# Patient Record
Sex: MALE | Race: WHITE | NOT HISPANIC OR LATINO | Employment: OTHER | URBAN - METROPOLITAN AREA
[De-identification: names, ages, dates, MRNs, and addresses within clinical notes are randomized per-mention and may not be internally consistent; named-entity substitution may affect disease eponyms.]

---

## 2022-01-01 ENCOUNTER — APPOINTMENT (OUTPATIENT)
Dept: WOUND CARE | Facility: HOSPITAL | Age: 87
End: 2022-01-01

## 2022-01-01 ENCOUNTER — LAB REQUISITION (OUTPATIENT)
Dept: LAB | Facility: HOSPITAL | Age: 87
End: 2022-01-01

## 2022-01-01 ENCOUNTER — OFFICE VISIT (OUTPATIENT)
Dept: WOUND CARE | Facility: HOSPITAL | Age: 87
End: 2022-01-01

## 2022-01-01 ENCOUNTER — APPOINTMENT (OUTPATIENT)
Dept: OTHER | Facility: HOSPITAL | Age: 87
End: 2022-01-01

## 2022-01-01 ENCOUNTER — HOSPITAL ENCOUNTER (INPATIENT)
Facility: HOSPITAL | Age: 87
LOS: 1 days | Discharge: HOME | End: 2022-10-29
Attending: EMERGENCY MEDICINE | Admitting: INTERNAL MEDICINE

## 2022-01-01 ENCOUNTER — HOSPITAL ENCOUNTER (INPATIENT)
Facility: HOSPITAL | Age: 87
LOS: 2 days | Discharge: HOME | End: 2022-04-03
Attending: SURGERY | Admitting: SURGERY

## 2022-01-01 ENCOUNTER — ANESTHESIA EVENT (OUTPATIENT)
Dept: PERIOP | Facility: HOSPITAL | Age: 87
End: 2022-01-01

## 2022-01-01 ENCOUNTER — OFFICE VISIT (OUTPATIENT)
Dept: NEUROSURGERY | Facility: CLINIC | Age: 87
End: 2022-01-01

## 2022-01-01 ENCOUNTER — TELEPHONE (OUTPATIENT)
Dept: NEUROSURGERY | Facility: CLINIC | Age: 87
End: 2022-01-01

## 2022-01-01 ENCOUNTER — PRE-ADMISSION TESTING (OUTPATIENT)
Dept: PREADMISSION TESTING | Facility: HOSPITAL | Age: 87
End: 2022-01-01

## 2022-01-01 ENCOUNTER — APPOINTMENT (OUTPATIENT)
Dept: GENERAL RADIOLOGY | Facility: HOSPITAL | Age: 87
End: 2022-01-01

## 2022-01-01 ENCOUNTER — ANESTHESIA (OUTPATIENT)
Dept: PERIOP | Facility: HOSPITAL | Age: 87
End: 2022-01-01

## 2022-01-01 ENCOUNTER — APPOINTMENT (OUTPATIENT)
Dept: PREADMISSION TESTING | Facility: HOSPITAL | Age: 87
End: 2022-01-01

## 2022-01-01 ENCOUNTER — APPOINTMENT (OUTPATIENT)
Dept: CT IMAGING | Facility: HOSPITAL | Age: 87
End: 2022-01-01

## 2022-01-01 ENCOUNTER — READMISSION MANAGEMENT (OUTPATIENT)
Dept: CALL CENTER | Facility: HOSPITAL | Age: 87
End: 2022-01-01

## 2022-01-01 ENCOUNTER — HOSPITAL ENCOUNTER (OUTPATIENT)
Dept: CT IMAGING | Facility: HOSPITAL | Age: 87
Discharge: HOME | End: 2022-11-18
Admitting: NEUROLOGICAL SURGERY

## 2022-01-01 VITALS
WEIGHT: 151.1 LBS | HEART RATE: 78 BPM | OXYGEN SATURATION: 97 % | RESPIRATION RATE: 18 BRPM | BODY MASS INDEX: 22.38 KG/M2 | TEMPERATURE: 97 F | HEIGHT: 69 IN | SYSTOLIC BLOOD PRESSURE: 129 MMHG | DIASTOLIC BLOOD PRESSURE: 79 MMHG

## 2022-01-01 VITALS
SYSTOLIC BLOOD PRESSURE: 118 MMHG | BODY MASS INDEX: 22.22 KG/M2 | WEIGHT: 150 LBS | DIASTOLIC BLOOD PRESSURE: 78 MMHG | HEIGHT: 69 IN | HEART RATE: 67 BPM | RESPIRATION RATE: 18 BRPM

## 2022-01-01 VITALS
HEIGHT: 69 IN | SYSTOLIC BLOOD PRESSURE: 125 MMHG | DIASTOLIC BLOOD PRESSURE: 81 MMHG | BODY MASS INDEX: 21.67 KG/M2 | WEIGHT: 146.3 LBS | RESPIRATION RATE: 16 BRPM

## 2022-01-01 VITALS
HEIGHT: 69 IN | TEMPERATURE: 97.8 F | HEART RATE: 93 BPM | DIASTOLIC BLOOD PRESSURE: 86 MMHG | OXYGEN SATURATION: 100 % | SYSTOLIC BLOOD PRESSURE: 128 MMHG | RESPIRATION RATE: 18 BRPM | WEIGHT: 150 LBS | BODY MASS INDEX: 22.22 KG/M2

## 2022-01-01 DIAGNOSIS — S51.812A SKIN TEAR OF LEFT FOREARM WITHOUT COMPLICATION, INITIAL ENCOUNTER: ICD-10-CM

## 2022-01-01 DIAGNOSIS — Z85.828 PERSONAL HISTORY OF OTHER MALIGNANT NEOPLASM OF SKIN: Primary | ICD-10-CM

## 2022-01-01 DIAGNOSIS — G96.08 TRAUMATIC SUBDURAL HYGROMA: ICD-10-CM

## 2022-01-01 DIAGNOSIS — Z00.00 ENCOUNTER FOR GENERAL ADULT MEDICAL EXAMINATION WITHOUT ABNORMAL FINDINGS: ICD-10-CM

## 2022-01-01 DIAGNOSIS — Z09 FOLLOW-UP EXAM: ICD-10-CM

## 2022-01-01 DIAGNOSIS — G96.08 TRAUMATIC SUBDURAL HYGROMA: Primary | ICD-10-CM

## 2022-01-01 DIAGNOSIS — S01.00XA OPEN WOUND OF SCALP, UNSPECIFIED OPEN WOUND TYPE, INITIAL ENCOUNTER: ICD-10-CM
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
## 2022-01-01 DIAGNOSIS — Z79.01 ON CONTINUOUS ORAL ANTICOAGULATION: ICD-10-CM

## 2022-01-01 DIAGNOSIS — G96.08 SUBDURAL HYGROMA: Primary | ICD-10-CM

## 2022-01-01 DIAGNOSIS — S01.00XD OPEN WOUND OF SCALP, UNSPECIFIED OPEN WOUND TYPE, SUBSEQUENT ENCOUNTER: ICD-10-CM

## 2022-01-01 LAB
ALBUMIN SERPL-MCNC: 4 G/DL (ref 3.5–5.2)
ALBUMIN/GLOB SERPL: 1.6 G/DL
ALP SERPL-CCNC: 157 U/L (ref 39–117)
ALT SERPL W P-5'-P-CCNC: 23 U/L (ref 1–41)
ANION GAP SERPL CALCULATED.3IONS-SCNC: 10.7 MMOL/L (ref 5–15)
ANION GAP SERPL CALCULATED.3IONS-SCNC: 8 MMOL/L (ref 5–15)
ANION GAP SERPL CALCULATED.3IONS-SCNC: 8 MMOL/L (ref 5–15)
ANION GAP SERPL CALCULATED.3IONS-SCNC: 9 MMOL/L (ref 5–15)
AST SERPL-CCNC: 24 U/L (ref 1–40)
BACTERIA SPEC AEROBE CULT: ABNORMAL
BACTERIA SPEC ANAEROBE CULT: NORMAL
BASOPHILS # BLD AUTO: 0.03 10*3/MM3 (ref 0–0.2)
BASOPHILS # BLD AUTO: 0.03 10*3/MM3 (ref 0–0.2)
BASOPHILS NFR BLD AUTO: 0.4 % (ref 0–1.5)
BASOPHILS NFR BLD AUTO: 0.4 % (ref 0–1.5)
BILIRUB SERPL-MCNC: 0.7 MG/DL (ref 0–1.2)
BUN SERPL-MCNC: 22 MG/DL (ref 8–23)
BUN SERPL-MCNC: 25 MG/DL (ref 8–23)
BUN SERPL-MCNC: 26 MG/DL (ref 8–23)
BUN SERPL-MCNC: 28 MG/DL (ref 8–23)
BUN/CREAT SERPL: 20.4 (ref 7–25)
BUN/CREAT SERPL: 22.6 (ref 7–25)
BUN/CREAT SERPL: 22.7 (ref 7–25)
BUN/CREAT SERPL: 25.5 (ref 7–25)
CALCIUM SPEC-SCNC: 8.7 MG/DL (ref 8.2–9.6)
CALCIUM SPEC-SCNC: 8.8 MG/DL (ref 8.2–9.6)
CALCIUM SPEC-SCNC: 9 MG/DL (ref 8.2–9.6)
CALCIUM SPEC-SCNC: 9 MG/DL (ref 8.2–9.6)
CHLORIDE SERPL-SCNC: 100 MMOL/L (ref 98–107)
CHLORIDE SERPL-SCNC: 100 MMOL/L (ref 98–107)
CHLORIDE SERPL-SCNC: 101 MMOL/L (ref 98–107)
CHLORIDE SERPL-SCNC: 103 MMOL/L (ref 98–107)
CO2 SERPL-SCNC: 26 MMOL/L (ref 22–29)
CO2 SERPL-SCNC: 26 MMOL/L (ref 22–29)
CO2 SERPL-SCNC: 28 MMOL/L (ref 22–29)
CO2 SERPL-SCNC: 28.3 MMOL/L (ref 22–29)
CREAT SERPL-MCNC: 1.08 MG/DL (ref 0.76–1.27)
CREAT SERPL-MCNC: 1.1 MG/DL (ref 0.76–1.27)
CREAT SERPL-MCNC: 1.1 MG/DL (ref 0.76–1.27)
CREAT SERPL-MCNC: 1.15 MG/DL (ref 0.76–1.27)
DEPRECATED RDW RBC AUTO: 42.9 FL (ref 37–54)
DEPRECATED RDW RBC AUTO: 44.6 FL (ref 37–54)
DEPRECATED RDW RBC AUTO: 47.6 FL (ref 37–54)
DEPRECATED RDW RBC AUTO: 48.4 FL (ref 37–54)
DEPRECATED RDW RBC AUTO: 48.6 FL (ref 37–54)
EGFRCR SERPLBLD CKD-EPI 2021: 58.2 ML/MIN/1.73
EGFRCR SERPLBLD CKD-EPI 2021: 61.4 ML/MIN/1.73
EGFRCR SERPLBLD CKD-EPI 2021: 61.4 ML/MIN/1.73
EGFRCR SERPLBLD CKD-EPI 2021: 62.8 ML/MIN/1.73
EOSINOPHIL # BLD AUTO: 0.16 10*3/MM3 (ref 0–0.4)
EOSINOPHIL # BLD AUTO: 0.24 10*3/MM3 (ref 0–0.4)
EOSINOPHIL NFR BLD AUTO: 2 % (ref 0.3–6.2)
EOSINOPHIL NFR BLD AUTO: 3 % (ref 0.3–6.2)
ERYTHROCYTE [DISTWIDTH] IN BLOOD BY AUTOMATED COUNT: 13.2 % (ref 12.3–15.4)
ERYTHROCYTE [DISTWIDTH] IN BLOOD BY AUTOMATED COUNT: 13.3 % (ref 12.3–15.4)
ERYTHROCYTE [DISTWIDTH] IN BLOOD BY AUTOMATED COUNT: 13.6 % (ref 12.3–15.4)
ERYTHROCYTE [DISTWIDTH] IN BLOOD BY AUTOMATED COUNT: 13.6 % (ref 12.3–15.4)
ERYTHROCYTE [DISTWIDTH] IN BLOOD BY AUTOMATED COUNT: 13.8 % (ref 12.3–15.4)
GLOBULIN UR ELPH-MCNC: 2.5 GM/DL
GLUCOSE SERPL-MCNC: 119 MG/DL (ref 65–99)
GLUCOSE SERPL-MCNC: 145 MG/DL (ref 65–99)
GLUCOSE SERPL-MCNC: 97 MG/DL (ref 65–99)
GLUCOSE SERPL-MCNC: 97 MG/DL (ref 65–99)
GRAM STN SPEC: ABNORMAL
GRAM STN SPEC: ABNORMAL
HCT VFR BLD AUTO: 29.1 % (ref 37.5–51)
HCT VFR BLD AUTO: 31 % (ref 37.5–51)
HCT VFR BLD AUTO: 32.2 % (ref 37.5–51)
HCT VFR BLD AUTO: 32.7 % (ref 37.5–51)
HCT VFR BLD AUTO: 36.7 % (ref 37.5–51)
HGB BLD-MCNC: 10.3 G/DL (ref 13–17.7)
HGB BLD-MCNC: 10.3 G/DL (ref 13–17.7)
HGB BLD-MCNC: 10.4 G/DL (ref 13–17.7)
HGB BLD-MCNC: 11.4 G/DL (ref 13–17.7)
HGB BLD-MCNC: 9.9 G/DL (ref 13–17.7)
IMM GRANULOCYTES # BLD AUTO: 0.04 10*3/MM3 (ref 0–0.05)
IMM GRANULOCYTES # BLD AUTO: 0.05 10*3/MM3 (ref 0–0.05)
IMM GRANULOCYTES NFR BLD AUTO: 0.5 % (ref 0–0.5)
IMM GRANULOCYTES NFR BLD AUTO: 0.6 % (ref 0–0.5)
LAB AP CASE REPORT: NORMAL
LAB AP CLINICAL INFORMATION: NORMAL
LAB AP DIAGNOSIS COMMENT: NORMAL
LYMPHOCYTES # BLD AUTO: 1.24 10*3/MM3 (ref 0.7–3.1)
LYMPHOCYTES # BLD AUTO: 1.4 10*3/MM3 (ref 0.7–3.1)
LYMPHOCYTES NFR BLD AUTO: 15.7 % (ref 19.6–45.3)
LYMPHOCYTES NFR BLD AUTO: 17.6 % (ref 19.6–45.3)
MCH RBC QN AUTO: 29.6 PG (ref 26.6–33)
MCH RBC QN AUTO: 30.2 PG (ref 26.6–33)
MCH RBC QN AUTO: 30.3 PG (ref 26.6–33)
MCH RBC QN AUTO: 30.5 PG (ref 26.6–33)
MCH RBC QN AUTO: 30.8 PG (ref 26.6–33)
MCHC RBC AUTO-ENTMCNC: 31.1 G/DL (ref 31.5–35.7)
MCHC RBC AUTO-ENTMCNC: 31.5 G/DL (ref 31.5–35.7)
MCHC RBC AUTO-ENTMCNC: 32 G/DL (ref 31.5–35.7)
MCHC RBC AUTO-ENTMCNC: 33.5 G/DL (ref 31.5–35.7)
MCHC RBC AUTO-ENTMCNC: 34 G/DL (ref 31.5–35.7)
MCV RBC AUTO: 89.5 FL (ref 79–97)
MCV RBC AUTO: 91.7 FL (ref 79–97)
MCV RBC AUTO: 94.7 FL (ref 79–97)
MCV RBC AUTO: 95.3 FL (ref 79–97)
MCV RBC AUTO: 95.9 FL (ref 79–97)
MONOCYTES # BLD AUTO: 0.67 10*3/MM3 (ref 0.1–0.9)
MONOCYTES # BLD AUTO: 0.75 10*3/MM3 (ref 0.1–0.9)
MONOCYTES NFR BLD AUTO: 8.4 % (ref 5–12)
MONOCYTES NFR BLD AUTO: 9.5 % (ref 5–12)
NEUTROPHILS NFR BLD AUTO: 5.59 10*3/MM3 (ref 1.7–7)
NEUTROPHILS NFR BLD AUTO: 5.65 10*3/MM3 (ref 1.7–7)
NEUTROPHILS NFR BLD AUTO: 70.9 % (ref 42.7–76)
NEUTROPHILS NFR BLD AUTO: 71 % (ref 42.7–76)
NRBC BLD AUTO-RTO: 0 /100 WBC (ref 0–0.2)
NRBC BLD AUTO-RTO: 0 /100 WBC (ref 0–0.2)
PATH REPORT.FINAL DX SPEC: NORMAL
PATH REPORT.GROSS SPEC: NORMAL
PLATELET # BLD AUTO: 133 10*3/MM3 (ref 140–450)
PLATELET # BLD AUTO: 141 10*3/MM3 (ref 140–450)
PLATELET # BLD AUTO: 141 10*3/MM3 (ref 140–450)
PLATELET # BLD AUTO: 142 10*3/MM3 (ref 140–450)
PLATELET # BLD AUTO: 148 10*3/MM3 (ref 140–450)
PMV BLD AUTO: 9.7 FL (ref 6–12)
PMV BLD AUTO: 9.8 FL (ref 6–12)
PMV BLD AUTO: 9.9 FL (ref 6–12)
POTASSIUM SERPL-SCNC: 3.9 MMOL/L (ref 3.5–5.2)
POTASSIUM SERPL-SCNC: 4 MMOL/L (ref 3.5–5.2)
POTASSIUM SERPL-SCNC: 4.4 MMOL/L (ref 3.5–5.2)
POTASSIUM SERPL-SCNC: 4.7 MMOL/L (ref 3.5–5.2)
PROT SERPL-MCNC: 6.5 G/DL (ref 6–8.5)
QT INTERVAL: 392 MS
RBC # BLD AUTO: 3.25 10*6/MM3 (ref 4.14–5.8)
RBC # BLD AUTO: 3.38 10*6/MM3 (ref 4.14–5.8)
RBC # BLD AUTO: 3.4 10*6/MM3 (ref 4.14–5.8)
RBC # BLD AUTO: 3.41 10*6/MM3 (ref 4.14–5.8)
RBC # BLD AUTO: 3.85 10*6/MM3 (ref 4.14–5.8)
SARS-COV-2 ORF1AB RESP QL NAA+PROBE: NOT DETECTED
SODIUM SERPL-SCNC: 135 MMOL/L (ref 136–145)
SODIUM SERPL-SCNC: 135 MMOL/L (ref 136–145)
SODIUM SERPL-SCNC: 139 MMOL/L (ref 136–145)
SODIUM SERPL-SCNC: 139 MMOL/L (ref 136–145)
WBC NRBC COR # BLD: 10.83 10*3/MM3 (ref 3.4–10.8)
WBC NRBC COR # BLD: 7.89 10*3/MM3 (ref 3.4–10.8)
WBC NRBC COR # BLD: 7.91 10*3/MM3 (ref 3.4–10.8)
WBC NRBC COR # BLD: 7.96 10*3/MM3 (ref 3.4–10.8)
WBC NRBC COR # BLD: 8.08 10*3/MM3 (ref 3.4–10.8)

## 2022-01-01 PROCEDURE — 70450 CT HEAD/BRAIN W/O DYE: CPT

## 2022-01-01 PROCEDURE — 0JB00ZZ EXCISION OF SCALP SUBCUTANEOUS TISSUE AND FASCIA, OPEN APPROACH: ICD-10-PCS | Performed by: SURGERY

## 2022-01-01 PROCEDURE — 0HB7XZZ EXCISION OF ABDOMEN SKIN, EXTERNAL APPROACH: ICD-10-PCS | Performed by: SURGERY

## 2022-01-01 PROCEDURE — 99284 EMERGENCY DEPT VISIT MOD MDM: CPT

## 2022-01-01 PROCEDURE — 25010000002 FENTANYL CITRATE (PF) 50 MCG/ML SOLUTION: Performed by: NURSE ANESTHETIST, CERTIFIED REGISTERED

## 2022-01-01 PROCEDURE — 80048 BASIC METABOLIC PNL TOTAL CA: CPT | Performed by: HOSPITALIST

## 2022-01-01 PROCEDURE — 80048 BASIC METABOLIC PNL TOTAL CA: CPT | Performed by: INTERNAL MEDICINE

## 2022-01-01 PROCEDURE — 63710000001 DIPHENHYDRAMINE PER 50 MG: Performed by: SURGERY

## 2022-01-01 PROCEDURE — 17250 CHEM CAUT OF GRANLTJ TISSUE: CPT

## 2022-01-01 PROCEDURE — G0463 HOSPITAL OUTPT CLINIC VISIT: HCPCS

## 2022-01-01 PROCEDURE — 99232 SBSQ HOSP IP/OBS MODERATE 35: CPT | Performed by: NEUROLOGICAL SURGERY

## 2022-01-01 PROCEDURE — U0005 INFEC AGEN DETEC AMPLI PROBE: HCPCS | Performed by: NURSE PRACTITIONER

## 2022-01-01 PROCEDURE — 85027 COMPLETE CBC AUTOMATED: CPT | Performed by: SURGERY

## 2022-01-01 PROCEDURE — G0378 HOSPITAL OBSERVATION PER HR: HCPCS

## 2022-01-01 PROCEDURE — C9803 HOPD COVID-19 SPEC COLLECT: HCPCS | Performed by: NURSE PRACTITIONER

## 2022-01-01 PROCEDURE — 85027 COMPLETE CBC AUTOMATED: CPT | Performed by: INTERNAL MEDICINE

## 2022-01-01 PROCEDURE — 87077 CULTURE AEROBIC IDENTIFY: CPT | Performed by: SURGERY

## 2022-01-01 PROCEDURE — 25010000002 CEFAZOLIN IN DEXTROSE 2-4 GM/100ML-% SOLUTION: Performed by: SURGERY

## 2022-01-01 PROCEDURE — 87205 SMEAR GRAM STAIN: CPT | Performed by: SURGERY

## 2022-01-01 PROCEDURE — 25010000002 MORPHINE SULFATE (PF) 2 MG/ML SOLUTION: Performed by: SURGERY

## 2022-01-01 PROCEDURE — 87015 SPECIMEN INFECT AGNT CONCNTJ: CPT | Performed by: SURGERY

## 2022-01-01 PROCEDURE — 25010000002 CEFAZOLIN PER 500 MG: Performed by: SURGERY

## 2022-01-01 PROCEDURE — 88305 TISSUE EXAM BY PATHOLOGIST: CPT | Performed by: SURGERY

## 2022-01-01 PROCEDURE — 25010000002 PHENYLEPHRINE 10 MG/ML SOLUTION 5 ML VIAL: Performed by: NURSE ANESTHETIST, CERTIFIED REGISTERED

## 2022-01-01 PROCEDURE — 99221 1ST HOSP IP/OBS SF/LOW 40: CPT | Performed by: NURSE PRACTITIONER

## 2022-01-01 PROCEDURE — 73070 X-RAY EXAM OF ELBOW: CPT

## 2022-01-01 PROCEDURE — 73130 X-RAY EXAM OF HAND: CPT

## 2022-01-01 PROCEDURE — 0JX00ZZ TRANSFER SCALP SUBCUTANEOUS TISSUE AND FASCIA, OPEN APPROACH: ICD-10-PCS | Performed by: SURGERY

## 2022-01-01 PROCEDURE — 87070 CULTURE OTHR SPECIMN AEROBIC: CPT | Performed by: SURGERY

## 2022-01-01 PROCEDURE — 97602 WOUND(S) CARE NON-SELECTIVE: CPT

## 2022-01-01 PROCEDURE — 25010000002 PROPOFOL 10 MG/ML EMULSION: Performed by: NURSE ANESTHETIST, CERTIFIED REGISTERED

## 2022-01-01 PROCEDURE — 36415 COLL VENOUS BLD VENIPUNCTURE: CPT | Performed by: INTERNAL MEDICINE

## 2022-01-01 PROCEDURE — 80053 COMPREHEN METABOLIC PANEL: CPT | Performed by: SURGERY

## 2022-01-01 PROCEDURE — 85025 COMPLETE CBC W/AUTO DIFF WBC: CPT | Performed by: HOSPITALIST

## 2022-01-01 PROCEDURE — U0004 COV-19 TEST NON-CDC HGH THRU: HCPCS | Performed by: NURSE PRACTITIONER

## 2022-01-01 PROCEDURE — 25010000002 PHENYLEPHRINE 10 MG/ML SOLUTION: Performed by: NURSE ANESTHETIST, CERTIFIED REGISTERED

## 2022-01-01 PROCEDURE — C1889 IMPLANT/INSERT DEVICE, NOC: HCPCS | Performed by: SURGERY

## 2022-01-01 PROCEDURE — 87186 SC STD MICRODIL/AGAR DIL: CPT | Performed by: SURGERY

## 2022-01-01 PROCEDURE — 88312 SPECIAL STAINS GROUP 1: CPT | Performed by: SURGERY

## 2022-01-01 PROCEDURE — 25010000002 NEOSTIGMINE 5 MG/10ML SOLUTION: Performed by: NURSE ANESTHETIST, CERTIFIED REGISTERED

## 2022-01-01 PROCEDURE — 99212 OFFICE O/P EST SF 10 MIN: CPT | Performed by: NEUROLOGICAL SURGERY

## 2022-01-01 PROCEDURE — 93005 ELECTROCARDIOGRAM TRACING: CPT | Performed by: SURGERY

## 2022-01-01 PROCEDURE — 87075 CULTR BACTERIA EXCEPT BLOOD: CPT | Performed by: SURGERY

## 2022-01-01 PROCEDURE — 93010 ELECTROCARDIOGRAM REPORT: CPT | Performed by: INTERNAL MEDICINE

## 2022-01-01 DEVICE — LIGACLIP MCA MULTIPLE CLIP APPLIERS, 20 SMALL CLIPS
Type: IMPLANTABLE DEVICE | Site: SCALP | Status: FUNCTIONAL
Brand: LIGACLIP

## 2022-01-01 RX ORDER — NEOSTIGMINE METHYLSULFATE 0.5 MG/ML
INJECTION, SOLUTION INTRAVENOUS AS NEEDED
Status: DISCONTINUED | OUTPATIENT
Start: 2022-01-01 | End: 2022-01-01 | Stop reason: SURG

## 2022-01-01 RX ORDER — FENTANYL CITRATE 50 UG/ML
50 INJECTION, SOLUTION INTRAMUSCULAR; INTRAVENOUS
Status: DISCONTINUED | OUTPATIENT
Start: 2022-01-01 | End: 2022-01-01 | Stop reason: HOSPADM

## 2022-01-01 RX ORDER — OXYCODONE AND ACETAMINOPHEN 7.5; 325 MG/1; MG/1
1 TABLET ORAL EVERY 4 HOURS PRN
Status: DISCONTINUED | OUTPATIENT
Start: 2022-01-01 | End: 2022-01-01 | Stop reason: HOSPADM

## 2022-01-01 RX ORDER — PANTOPRAZOLE SODIUM 40 MG/1
40 TABLET, DELAYED RELEASE ORAL
Status: DISCONTINUED | OUTPATIENT
Start: 2022-01-01 | End: 2022-01-01 | Stop reason: HOSPADM

## 2022-01-01 RX ORDER — PROPOFOL 10 MG/ML
VIAL (ML) INTRAVENOUS AS NEEDED
Status: DISCONTINUED | OUTPATIENT
Start: 2022-01-01 | End: 2022-01-01 | Stop reason: SURG

## 2022-01-01 RX ORDER — LISINOPRIL 10 MG/1
10 TABLET ORAL EVERY EVENING
COMMUNITY
End: 2022-01-01 | Stop reason: ALTCHOICE

## 2022-01-01 RX ORDER — FLUMAZENIL 0.1 MG/ML
0.2 INJECTION INTRAVENOUS AS NEEDED
Status: DISCONTINUED | OUTPATIENT
Start: 2022-01-01 | End: 2022-01-01 | Stop reason: HOSPADM

## 2022-01-01 RX ORDER — UREA 10 %
3 LOTION (ML) TOPICAL NIGHTLY PRN
Status: DISCONTINUED | OUTPATIENT
Start: 2022-01-01 | End: 2022-01-01 | Stop reason: HOSPADM

## 2022-01-01 RX ORDER — MORPHINE SULFATE 2 MG/ML
2 INJECTION, SOLUTION INTRAMUSCULAR; INTRAVENOUS EVERY 4 HOURS PRN
Status: DISCONTINUED | OUTPATIENT
Start: 2022-01-01 | End: 2022-01-01 | Stop reason: HOSPADM

## 2022-01-01 RX ORDER — TORSEMIDE 20 MG/1
20 TABLET ORAL DAILY
Status: DISCONTINUED | OUTPATIENT
Start: 2022-01-01 | End: 2022-01-01 | Stop reason: HOSPADM

## 2022-01-01 RX ORDER — PHENYLEPHRINE HYDROCHLORIDE 10 MG/ML
INJECTION INTRAVENOUS AS NEEDED
Status: DISCONTINUED | OUTPATIENT
Start: 2022-01-01 | End: 2022-01-01 | Stop reason: SURG

## 2022-01-01 RX ORDER — ACETAMINOPHEN 325 MG/1
650 TABLET ORAL EVERY 4 HOURS PRN
Status: DISCONTINUED | OUTPATIENT
Start: 2022-01-01 | End: 2022-01-01 | Stop reason: HOSPADM

## 2022-01-01 RX ORDER — METOPROLOL TARTRATE 100 MG/1
100 TABLET ORAL 2 TIMES DAILY
COMMUNITY

## 2022-01-01 RX ORDER — SODIUM CHLORIDE 0.9 % (FLUSH) 0.9 %
3-10 SYRINGE (ML) INJECTION AS NEEDED
Status: DISCONTINUED | OUTPATIENT
Start: 2022-01-01 | End: 2022-01-01 | Stop reason: HOSPADM

## 2022-01-01 RX ORDER — METOPROLOL TARTRATE 50 MG/1
100 TABLET, FILM COATED ORAL 2 TIMES DAILY
Status: DISCONTINUED | OUTPATIENT
Start: 2022-01-01 | End: 2022-01-01 | Stop reason: HOSPADM

## 2022-01-01 RX ORDER — TORSEMIDE 20 MG/1
20 TABLET ORAL DAILY
COMMUNITY

## 2022-01-01 RX ORDER — FAMOTIDINE 10 MG/ML
20 INJECTION, SOLUTION INTRAVENOUS ONCE
Status: COMPLETED | OUTPATIENT
Start: 2022-01-01 | End: 2022-01-01

## 2022-01-01 RX ORDER — ATORVASTATIN CALCIUM 20 MG/1
20 TABLET, FILM COATED ORAL DAILY
COMMUNITY

## 2022-01-01 RX ORDER — SODIUM CHLORIDE, SODIUM LACTATE, POTASSIUM CHLORIDE, CALCIUM CHLORIDE 600; 310; 30; 20 MG/100ML; MG/100ML; MG/100ML; MG/100ML
9 INJECTION, SOLUTION INTRAVENOUS CONTINUOUS
Status: DISCONTINUED | OUTPATIENT
Start: 2022-01-01 | End: 2022-01-01

## 2022-01-01 RX ORDER — ONDANSETRON 2 MG/ML
4 INJECTION INTRAMUSCULAR; INTRAVENOUS EVERY 6 HOURS PRN
Status: DISCONTINUED | OUTPATIENT
Start: 2022-01-01 | End: 2022-01-01 | Stop reason: HOSPADM

## 2022-01-01 RX ORDER — POTASSIUM CHLORIDE 1.5 G/1.77G
20 POWDER, FOR SOLUTION ORAL DAILY
Status: DISCONTINUED | OUTPATIENT
Start: 2022-01-01 | End: 2022-01-01 | Stop reason: HOSPADM

## 2022-01-01 RX ORDER — HYDROCODONE BITARTRATE AND ACETAMINOPHEN 5; 325 MG/1; MG/1
1 TABLET ORAL EVERY 6 HOURS PRN
Status: DISCONTINUED | OUTPATIENT
Start: 2022-01-01 | End: 2022-01-01 | Stop reason: HOSPADM

## 2022-01-01 RX ORDER — SODIUM CHLORIDE, SODIUM LACTATE, POTASSIUM CHLORIDE, CALCIUM CHLORIDE 600; 310; 30; 20 MG/100ML; MG/100ML; MG/100ML; MG/100ML
75 INJECTION, SOLUTION INTRAVENOUS CONTINUOUS
Status: DISCONTINUED | OUTPATIENT
Start: 2022-01-01 | End: 2022-01-01

## 2022-01-01 RX ORDER — FERROUS SULFATE 325(65) MG
325 TABLET ORAL
Status: DISCONTINUED | OUTPATIENT
Start: 2022-01-01 | End: 2022-01-01 | Stop reason: HOSPADM

## 2022-01-01 RX ORDER — CHOLECALCIFEROL (VITAMIN D3) 125 MCG
1000 CAPSULE ORAL EVERY MORNING
Status: DISCONTINUED | OUTPATIENT
Start: 2022-01-01 | End: 2022-01-01 | Stop reason: HOSPADM

## 2022-01-01 RX ORDER — ATORVASTATIN CALCIUM 20 MG/1
20 TABLET, FILM COATED ORAL DAILY
Status: DISCONTINUED | OUTPATIENT
Start: 2022-01-01 | End: 2022-01-01 | Stop reason: HOSPADM

## 2022-01-01 RX ORDER — NALOXONE HCL 0.4 MG/ML
0.2 VIAL (ML) INJECTION AS NEEDED
Status: DISCONTINUED | OUTPATIENT
Start: 2022-01-01 | End: 2022-01-01 | Stop reason: HOSPADM

## 2022-01-01 RX ORDER — DIPHENHYDRAMINE HCL 25 MG
25 CAPSULE ORAL EVERY 6 HOURS PRN
Status: DISCONTINUED | OUTPATIENT
Start: 2022-01-01 | End: 2022-01-01 | Stop reason: HOSPADM

## 2022-01-01 RX ORDER — ROCURONIUM BROMIDE 10 MG/ML
INJECTION, SOLUTION INTRAVENOUS AS NEEDED
Status: DISCONTINUED | OUTPATIENT
Start: 2022-01-01 | End: 2022-01-01 | Stop reason: SURG

## 2022-01-01 RX ORDER — LIDOCAINE HYDROCHLORIDE 10 MG/ML
0.5 INJECTION, SOLUTION EPIDURAL; INFILTRATION; INTRACAUDAL; PERINEURAL ONCE AS NEEDED
Status: DISCONTINUED | OUTPATIENT
Start: 2022-01-01 | End: 2022-01-01 | Stop reason: HOSPADM

## 2022-01-01 RX ORDER — MULTIPLE VITAMINS W/ MINERALS TAB 9MG-400MCG
1 TAB ORAL DAILY
Status: DISCONTINUED | OUTPATIENT
Start: 2022-01-01 | End: 2022-01-01 | Stop reason: HOSPADM

## 2022-01-01 RX ORDER — NITROGLYCERIN 0.4 MG/1
0.4 TABLET SUBLINGUAL
Status: DISCONTINUED | OUTPATIENT
Start: 2022-01-01 | End: 2022-01-01 | Stop reason: HOSPADM

## 2022-01-01 RX ORDER — HYDRALAZINE HYDROCHLORIDE 20 MG/ML
5 INJECTION INTRAMUSCULAR; INTRAVENOUS
Status: DISCONTINUED | OUTPATIENT
Start: 2022-01-01 | End: 2022-01-01 | Stop reason: HOSPADM

## 2022-01-01 RX ORDER — GLYCOPYRROLATE 0.2 MG/ML
INJECTION INTRAMUSCULAR; INTRAVENOUS AS NEEDED
Status: DISCONTINUED | OUTPATIENT
Start: 2022-01-01 | End: 2022-01-01 | Stop reason: SURG

## 2022-01-01 RX ORDER — DIPHENHYDRAMINE HCL 25 MG
25 CAPSULE ORAL
Status: DISCONTINUED | OUTPATIENT
Start: 2022-01-01 | End: 2022-01-01 | Stop reason: HOSPADM

## 2022-01-01 RX ORDER — LABETALOL HYDROCHLORIDE 5 MG/ML
5 INJECTION, SOLUTION INTRAVENOUS
Status: DISCONTINUED | OUTPATIENT
Start: 2022-01-01 | End: 2022-01-01 | Stop reason: HOSPADM

## 2022-01-01 RX ORDER — APIXABAN 2.5 MG/1
2.5 TABLET, FILM COATED ORAL 2 TIMES DAILY
COMMUNITY
End: 2022-01-01 | Stop reason: HOSPADM

## 2022-01-01 RX ORDER — LANOLIN ALCOHOL/MO/W.PET/CERES
325 CREAM (GRAM) TOPICAL DAILY
COMMUNITY
Start: 2022-01-01

## 2022-01-01 RX ORDER — ACETAMINOPHEN 325 MG/1
325 TABLET ORAL EVERY 6 HOURS PRN
Status: DISCONTINUED | OUTPATIENT
Start: 2022-01-01 | End: 2022-01-01 | Stop reason: HOSPADM

## 2022-01-01 RX ORDER — BUPIVACAINE HYDROCHLORIDE AND EPINEPHRINE 2.5; 5 MG/ML; UG/ML
INJECTION, SOLUTION INFILTRATION; PERINEURAL AS NEEDED
Status: DISCONTINUED | OUTPATIENT
Start: 2022-01-01 | End: 2022-01-01 | Stop reason: HOSPADM

## 2022-01-01 RX ORDER — EPHEDRINE SULFATE 50 MG/ML
5 INJECTION, SOLUTION INTRAVENOUS ONCE AS NEEDED
Status: DISCONTINUED | OUTPATIENT
Start: 2022-01-01 | End: 2022-01-01 | Stop reason: HOSPADM

## 2022-01-01 RX ORDER — ASPIRIN 81 MG/1
81 TABLET, CHEWABLE ORAL DAILY
Status: DISCONTINUED | OUTPATIENT
Start: 2022-01-01 | End: 2022-01-01 | Stop reason: HOSPADM

## 2022-01-01 RX ORDER — MULTIVIT AND MINERALS-FERROUS GLUCONATE 9 MG IRON/15 ML ORAL LIQUID 9 MG/15 ML
15 LIQUID (ML) ORAL DAILY
Status: DISCONTINUED | OUTPATIENT
Start: 2022-01-01 | End: 2022-01-01 | Stop reason: HOSPADM

## 2022-01-01 RX ORDER — ASPIRIN 81 MG/1
81 TABLET ORAL DAILY
Status: DISCONTINUED | OUTPATIENT
Start: 2022-01-01 | End: 2022-01-01 | Stop reason: HOSPADM

## 2022-01-01 RX ORDER — ONDANSETRON 4 MG/1
4 TABLET, FILM COATED ORAL EVERY 6 HOURS PRN
Status: DISCONTINUED | OUTPATIENT
Start: 2022-01-01 | End: 2022-01-01 | Stop reason: HOSPADM

## 2022-01-01 RX ORDER — HYDROCODONE BITARTRATE AND ACETAMINOPHEN 7.5; 325 MG/1; MG/1
1 TABLET ORAL ONCE AS NEEDED
Status: DISCONTINUED | OUTPATIENT
Start: 2022-01-01 | End: 2022-01-01 | Stop reason: HOSPADM

## 2022-01-01 RX ORDER — CEFAZOLIN SODIUM 1 G/50ML
1 INJECTION, SOLUTION INTRAVENOUS EVERY 8 HOURS
Status: DISCONTINUED | OUTPATIENT
Start: 2022-01-01 | End: 2022-01-01

## 2022-01-01 RX ORDER — IBUPROFEN 600 MG/1
600 TABLET ORAL ONCE AS NEEDED
Status: DISCONTINUED | OUTPATIENT
Start: 2022-01-01 | End: 2022-01-01 | Stop reason: HOSPADM

## 2022-01-01 RX ORDER — ANTIOX #8/OM3/DHA/EPA/LUT/ZEAX 250-2.5 MG
1 CAPSULE ORAL 2 TIMES DAILY
COMMUNITY

## 2022-01-01 RX ORDER — LIDOCAINE HYDROCHLORIDE 20 MG/ML
INJECTION, SOLUTION INFILTRATION; PERINEURAL AS NEEDED
Status: DISCONTINUED | OUTPATIENT
Start: 2022-01-01 | End: 2022-01-01 | Stop reason: SURG

## 2022-01-01 RX ORDER — LISINOPRIL 10 MG/1
10 TABLET ORAL EVERY EVENING
Status: DISCONTINUED | OUTPATIENT
Start: 2022-01-01 | End: 2022-01-01 | Stop reason: HOSPADM

## 2022-01-01 RX ORDER — SODIUM CHLORIDE 0.9 % (FLUSH) 0.9 %
3 SYRINGE (ML) INJECTION EVERY 12 HOURS SCHEDULED
Status: DISCONTINUED | OUTPATIENT
Start: 2022-01-01 | End: 2022-01-01 | Stop reason: HOSPADM

## 2022-01-01 RX ORDER — ASPIRIN 81 MG/1
81 TABLET ORAL DAILY
Status: ON HOLD | COMMUNITY
End: 2023-01-01

## 2022-01-01 RX ORDER — CEFAZOLIN SODIUM 2 G/100ML
2 INJECTION, SOLUTION INTRAVENOUS EVERY 8 HOURS
Status: DISCONTINUED | OUTPATIENT
Start: 2022-01-01 | End: 2022-01-01

## 2022-01-01 RX ORDER — MORPHINE SULFATE 10 MG/ML
2 INJECTION INTRAMUSCULAR; INTRAVENOUS; SUBCUTANEOUS EVERY 4 HOURS PRN
Status: DISCONTINUED | OUTPATIENT
Start: 2022-01-01 | End: 2022-01-01

## 2022-01-01 RX ORDER — ACETAMINOPHEN 650 MG
TABLET, EXTENDED RELEASE ORAL AS NEEDED
Status: DISCONTINUED | OUTPATIENT
Start: 2022-01-01 | End: 2022-01-01 | Stop reason: HOSPADM

## 2022-01-01 RX ORDER — PROMETHAZINE HYDROCHLORIDE 25 MG/1
25 SUPPOSITORY RECTAL ONCE AS NEEDED
Status: DISCONTINUED | OUTPATIENT
Start: 2022-01-01 | End: 2022-01-01 | Stop reason: HOSPADM

## 2022-01-01 RX ORDER — CEPHALEXIN 500 MG/1
500 CAPSULE ORAL 2 TIMES DAILY
Qty: 14 CAPSULE | Refills: 1 | Status: SHIPPED | OUTPATIENT
Start: 2022-01-01 | End: 2022-01-01

## 2022-01-01 RX ORDER — FENTANYL CITRATE 50 UG/ML
INJECTION, SOLUTION INTRAMUSCULAR; INTRAVENOUS AS NEEDED
Status: DISCONTINUED | OUTPATIENT
Start: 2022-01-01 | End: 2022-01-01 | Stop reason: SURG

## 2022-01-01 RX ORDER — ONDANSETRON 2 MG/ML
4 INJECTION INTRAMUSCULAR; INTRAVENOUS ONCE AS NEEDED
Status: DISCONTINUED | OUTPATIENT
Start: 2022-01-01 | End: 2022-01-01 | Stop reason: HOSPADM

## 2022-01-01 RX ORDER — PANTOPRAZOLE SODIUM 40 MG/10ML
40 INJECTION, POWDER, LYOPHILIZED, FOR SOLUTION INTRAVENOUS
Status: DISCONTINUED | OUTPATIENT
Start: 2022-01-01 | End: 2022-01-01 | Stop reason: HOSPADM

## 2022-01-01 RX ORDER — POTASSIUM CHLORIDE 750 MG/1
20 TABLET, FILM COATED, EXTENDED RELEASE ORAL DAILY
Status: DISCONTINUED | OUTPATIENT
Start: 2022-01-01 | End: 2022-01-01 | Stop reason: HOSPADM

## 2022-01-01 RX ORDER — DIPHENHYDRAMINE HYDROCHLORIDE 50 MG/ML
12.5 INJECTION INTRAMUSCULAR; INTRAVENOUS
Status: DISCONTINUED | OUTPATIENT
Start: 2022-01-01 | End: 2022-01-01 | Stop reason: HOSPADM

## 2022-01-01 RX ORDER — ACETAMINOPHEN 160 MG/5ML
15 SOLUTION ORAL EVERY 4 HOURS PRN
COMMUNITY
End: 2022-01-01

## 2022-01-01 RX ORDER — PROMETHAZINE HYDROCHLORIDE 25 MG/1
25 TABLET ORAL ONCE AS NEEDED
Status: DISCONTINUED | OUTPATIENT
Start: 2022-01-01 | End: 2022-01-01 | Stop reason: HOSPADM

## 2022-01-01 RX ORDER — MAGNESIUM HYDROXIDE 1200 MG/15ML
LIQUID ORAL AS NEEDED
Status: DISCONTINUED | OUTPATIENT
Start: 2022-01-01 | End: 2022-01-01 | Stop reason: HOSPADM

## 2022-01-01 RX ORDER — EPHEDRINE SULFATE 50 MG/ML
INJECTION, SOLUTION INTRAVENOUS AS NEEDED
Status: DISCONTINUED | OUTPATIENT
Start: 2022-01-01 | End: 2022-01-01 | Stop reason: SURG

## 2022-01-01 RX ORDER — HYDROMORPHONE HYDROCHLORIDE 1 MG/ML
0.5 INJECTION, SOLUTION INTRAMUSCULAR; INTRAVENOUS; SUBCUTANEOUS
Status: DISCONTINUED | OUTPATIENT
Start: 2022-01-01 | End: 2022-01-01 | Stop reason: HOSPADM

## 2022-01-01 RX ADMIN — TORSEMIDE 20 MG: 20 TABLET ORAL at 08:43

## 2022-01-01 RX ADMIN — PANTOPRAZOLE SODIUM 40 MG: 40 INJECTION, POWDER, FOR SOLUTION INTRAVENOUS at 06:44

## 2022-01-01 RX ADMIN — POTASSIUM CHLORIDE 20 MEQ: 750 TABLET, EXTENDED RELEASE ORAL at 09:34

## 2022-01-01 RX ADMIN — PHENYLEPHRINE HYDROCHLORIDE 100 MCG: 10 INJECTION, SOLUTION INTRAVENOUS at 14:59

## 2022-01-01 RX ADMIN — PHENYLEPHRINE HYDROCHLORIDE 100 MCG: 10 INJECTION, SOLUTION INTRAVENOUS at 11:45

## 2022-01-01 RX ADMIN — METOPROLOL TARTRATE 100 MG: 50 TABLET, FILM COATED ORAL at 21:49

## 2022-01-01 RX ADMIN — CEFAZOLIN SODIUM 1 G: 10 INJECTION, POWDER, FOR SOLUTION INTRAVENOUS at 06:11

## 2022-01-01 RX ADMIN — ROCURONIUM BROMIDE 10 MG: 50 INJECTION INTRAVENOUS at 12:46

## 2022-01-01 RX ADMIN — METOPROLOL TARTRATE 100 MG: 50 TABLET, FILM COATED ORAL at 08:41

## 2022-01-01 RX ADMIN — EPHEDRINE SULFATE 10 MG: 50 INJECTION INTRAVENOUS at 10:29

## 2022-01-01 RX ADMIN — CEFAZOLIN SODIUM 1 G: 10 INJECTION, POWDER, FOR SOLUTION INTRAVENOUS at 23:44

## 2022-01-01 RX ADMIN — FAMOTIDINE 20 MG: 10 INJECTION INTRAVENOUS at 09:46

## 2022-01-01 RX ADMIN — APIXABAN 2.5 MG: 2.5 TABLET, FILM COATED ORAL at 08:47

## 2022-01-01 RX ADMIN — METOPROLOL TARTRATE 100 MG: 50 TABLET, FILM COATED ORAL at 08:49

## 2022-01-01 RX ADMIN — PHENYLEPHRINE HYDROCHLORIDE 0.5 MCG/KG/MIN: 10 INJECTION INTRAVENOUS at 11:50

## 2022-01-01 RX ADMIN — FERROUS SULFATE TAB 325 MG (65 MG ELEMENTAL FE) 325 MG: 325 (65 FE) TAB at 08:43

## 2022-01-01 RX ADMIN — POTASSIUM CHLORIDE 20 MEQ: 1.5 POWDER, FOR SOLUTION ORAL at 20:28

## 2022-01-01 RX ADMIN — CEFAZOLIN SODIUM 2 G: 2 INJECTION, SOLUTION INTRAVENOUS at 14:10

## 2022-01-01 RX ADMIN — ASPIRIN 81 MG: 81 TABLET, COATED ORAL at 09:33

## 2022-01-01 RX ADMIN — ROCURONIUM BROMIDE 40 MG: 50 INJECTION INTRAVENOUS at 10:22

## 2022-01-01 RX ADMIN — APIXABAN 2.5 MG: 2.5 TABLET, FILM COATED ORAL at 21:49

## 2022-01-01 RX ADMIN — Medication 3 MG: at 22:08

## 2022-01-01 RX ADMIN — PHENYLEPHRINE HYDROCHLORIDE 100 MCG: 10 INJECTION, SOLUTION INTRAVENOUS at 11:40

## 2022-01-01 RX ADMIN — LIDOCAINE HYDROCHLORIDE 60 MG: 20 INJECTION, SOLUTION INFILTRATION; PERINEURAL at 10:22

## 2022-01-01 RX ADMIN — PROPOFOL 25 MG: 10 INJECTION, EMULSION INTRAVENOUS at 12:45

## 2022-01-01 RX ADMIN — PROPOFOL 50 MG: 10 INJECTION, EMULSION INTRAVENOUS at 10:56

## 2022-01-01 RX ADMIN — TORSEMIDE 20 MG: 20 TABLET ORAL at 08:49

## 2022-01-01 RX ADMIN — FENTANYL CITRATE 25 MCG: 0.05 INJECTION, SOLUTION INTRAMUSCULAR; INTRAVENOUS at 12:56

## 2022-01-01 RX ADMIN — FERROUS SULFATE TAB 325 MG (65 MG ELEMENTAL FE) 325 MG: 325 (65 FE) TAB at 08:49

## 2022-01-01 RX ADMIN — Medication 1000 MCG: at 09:34

## 2022-01-01 RX ADMIN — ATORVASTATIN CALCIUM 20 MG: 20 TABLET, FILM COATED ORAL at 09:33

## 2022-01-01 RX ADMIN — ATORVASTATIN CALCIUM 20 MG: 20 TABLET, FILM COATED ORAL at 08:47

## 2022-01-01 RX ADMIN — METOPROLOL TARTRATE 100 MG: 50 TABLET, FILM COATED ORAL at 22:08

## 2022-01-01 RX ADMIN — POTASSIUM CHLORIDE 20 MEQ: 1.5 POWDER, FOR SOLUTION ORAL at 08:47

## 2022-01-01 RX ADMIN — SODIUM CHLORIDE, POTASSIUM CHLORIDE, SODIUM LACTATE AND CALCIUM CHLORIDE 75 ML/HR: 600; 310; 30; 20 INJECTION, SOLUTION INTRAVENOUS at 22:08

## 2022-01-01 RX ADMIN — TORSEMIDE 20 MG: 20 TABLET ORAL at 09:34

## 2022-01-01 RX ADMIN — ASPIRIN 81 MG: 81 TABLET, CHEWABLE ORAL at 18:09

## 2022-01-01 RX ADMIN — LISINOPRIL 10 MG: 10 TABLET ORAL at 18:15

## 2022-01-01 RX ADMIN — NEOSTIGMINE METHYLSULFATE 2 MG: 0.5 INJECTION INTRAVENOUS at 14:32

## 2022-01-01 RX ADMIN — CEFAZOLIN SODIUM 2 G: 2 INJECTION, SOLUTION INTRAVENOUS at 10:10

## 2022-01-01 RX ADMIN — FENTANYL CITRATE 25 MCG: 0.05 INJECTION, SOLUTION INTRAMUSCULAR; INTRAVENOUS at 12:46

## 2022-01-01 RX ADMIN — SODIUM CHLORIDE, POTASSIUM CHLORIDE, SODIUM LACTATE AND CALCIUM CHLORIDE: 600; 310; 30; 20 INJECTION, SOLUTION INTRAVENOUS at 15:02

## 2022-01-01 RX ADMIN — FENTANYL CITRATE 25 MCG: 0.05 INJECTION, SOLUTION INTRAMUSCULAR; INTRAVENOUS at 10:22

## 2022-01-01 RX ADMIN — MORPHINE SULFATE 2 MG: 2 INJECTION, SOLUTION INTRAMUSCULAR; INTRAVENOUS at 03:59

## 2022-01-01 RX ADMIN — Medication 15 ML: at 08:41

## 2022-01-01 RX ADMIN — PHENYLEPHRINE HYDROCHLORIDE 100 MCG: 10 INJECTION, SOLUTION INTRAVENOUS at 11:29

## 2022-01-01 RX ADMIN — ASPIRIN 81 MG: 81 TABLET, CHEWABLE ORAL at 08:47

## 2022-01-01 RX ADMIN — SODIUM CHLORIDE, POTASSIUM CHLORIDE, SODIUM LACTATE AND CALCIUM CHLORIDE 9 ML/HR: 600; 310; 30; 20 INJECTION, SOLUTION INTRAVENOUS at 09:40

## 2022-01-01 RX ADMIN — Medication 15 ML: at 20:28

## 2022-01-01 RX ADMIN — Medication 15 ML: at 08:49

## 2022-01-01 RX ADMIN — SODIUM CHLORIDE, POTASSIUM CHLORIDE, SODIUM LACTATE AND CALCIUM CHLORIDE 75 ML/HR: 600; 310; 30; 20 INJECTION, SOLUTION INTRAVENOUS at 08:57

## 2022-01-01 RX ADMIN — CEFAZOLIN SODIUM 1 G: 10 INJECTION, POWDER, FOR SOLUTION INTRAVENOUS at 15:43

## 2022-01-01 RX ADMIN — PROPOFOL 100 MG: 10 INJECTION, EMULSION INTRAVENOUS at 10:22

## 2022-01-01 RX ADMIN — DIPHENHYDRAMINE HYDROCHLORIDE 25 MG: 25 CAPSULE ORAL at 01:51

## 2022-01-01 RX ADMIN — FERROUS SULFATE TAB 325 MG (65 MG ELEMENTAL FE) 325 MG: 325 (65 FE) TAB at 09:34

## 2022-01-01 RX ADMIN — CEFAZOLIN SODIUM 1 G: 10 INJECTION, POWDER, FOR SOLUTION INTRAVENOUS at 22:08

## 2022-01-01 RX ADMIN — ATORVASTATIN CALCIUM 20 MG: 20 TABLET, FILM COATED ORAL at 20:32

## 2022-01-01 RX ADMIN — METOPROLOL TARTRATE 100 MG: 50 TABLET, FILM COATED ORAL at 21:03

## 2022-01-01 RX ADMIN — POTASSIUM CHLORIDE 20 MEQ: 1.5 POWDER, FOR SOLUTION ORAL at 08:50

## 2022-01-01 RX ADMIN — TORSEMIDE 20 MG: 20 TABLET ORAL at 20:28

## 2022-01-01 RX ADMIN — Medication 15 ML: at 08:47

## 2022-01-01 RX ADMIN — PANTOPRAZOLE SODIUM 40 MG: 40 TABLET, DELAYED RELEASE ORAL at 06:11

## 2022-01-01 RX ADMIN — FENTANYL CITRATE 25 MCG: 0.05 INJECTION, SOLUTION INTRAMUSCULAR; INTRAVENOUS at 10:56

## 2022-01-01 RX ADMIN — CEFAZOLIN SODIUM 1 G: 10 INJECTION, POWDER, FOR SOLUTION INTRAVENOUS at 08:47

## 2022-01-01 RX ADMIN — FERROUS SULFATE TAB 325 MG (65 MG ELEMENTAL FE) 325 MG: 325 (65 FE) TAB at 08:47

## 2022-01-01 RX ADMIN — PANTOPRAZOLE SODIUM 40 MG: 40 TABLET, DELAYED RELEASE ORAL at 07:42

## 2022-01-01 RX ADMIN — METOPROLOL TARTRATE 100 MG: 50 TABLET, FILM COATED ORAL at 09:32

## 2022-01-01 RX ADMIN — CEFAZOLIN SODIUM 1 G: 10 INJECTION, POWDER, FOR SOLUTION INTRAVENOUS at 06:44

## 2022-01-01 RX ADMIN — ATORVASTATIN CALCIUM 20 MG: 20 TABLET, FILM COATED ORAL at 08:49

## 2022-01-01 RX ADMIN — PHENYLEPHRINE HYDROCHLORIDE 100 MCG: 10 INJECTION, SOLUTION INTRAVENOUS at 10:29

## 2022-01-01 RX ADMIN — CEFAZOLIN SODIUM 1 G: 10 INJECTION, POWDER, FOR SOLUTION INTRAVENOUS at 00:37

## 2022-01-01 RX ADMIN — GLYCOPYRROLATE 0.4 MG: 0.2 INJECTION INTRAMUSCULAR; INTRAVENOUS at 14:32

## 2022-01-01 RX ADMIN — MULTIPLE VITAMINS W/ MINERALS TAB 1 TABLET: TAB at 09:34

## 2022-01-01 RX ADMIN — METOPROLOL TARTRATE 100 MG: 50 TABLET, FILM COATED ORAL at 20:32

## 2022-01-01 RX ADMIN — Medication 3 MG: at 21:49

## 2022-01-01 RX ADMIN — METOPROLOL TARTRATE 100 MG: 50 TABLET, FILM COATED ORAL at 08:47

## 2022-01-01 RX ADMIN — SODIUM CHLORIDE, POTASSIUM CHLORIDE, SODIUM LACTATE AND CALCIUM CHLORIDE 75 ML/HR: 600; 310; 30; 20 INJECTION, SOLUTION INTRAVENOUS at 20:39

## 2022-01-01 RX ADMIN — TORSEMIDE 20 MG: 20 TABLET ORAL at 08:47

## 2022-03-25 PROBLEM — Z85.828 PERSONAL HISTORY OF OTHER MALIGNANT NEOPLASM OF SKIN: Status: ACTIVE | Noted: 2022-01-01

## 2022-03-25 PROBLEM — S01.00XA OPEN WND OF SCALP: Status: ACTIVE | Noted: 2022-01-01

## 2022-03-31 PROBLEM — S11.90XA: Status: ACTIVE | Noted: 2022-01-01

## 2022-03-31 PROBLEM — S01.90XA: Status: ACTIVE | Noted: 2022-01-01

## 2022-03-31 NOTE — ANESTHESIA PROCEDURE NOTES
Airway  Urgency: elective    Date/Time: 3/31/2022 10:23 AM  Airway not difficult    General Information and Staff    Patient location during procedure: OR  Anesthesiologist: Jose Carlos Benítez MD  CRNA: Vik Heath CRNA    Indications and Patient Condition  Indications for airway management: airway protection    Preoxygenated: yes  MILS maintained throughout  Mask difficulty assessment: 1 - vent by mask    Final Airway Details  Final airway type: endotracheal airway      Successful airway: ETT  Cuffed: yes   Successful intubation technique: direct laryngoscopy  Endotracheal tube insertion site: oral  Blade: Dianne  Blade size: 4  ETT size (mm): 7.5  Cormack-Lehane Classification: grade I - full view of glottis  Placement verified by: chest auscultation and capnometry   Measured from: lips  ETT/EBT  to lips (cm): 20  Number of attempts at approach: 1  Assessment: lips, teeth, and gum same as pre-op and atraumatic intubation

## 2022-03-31 NOTE — ANESTHESIA POSTPROCEDURE EVALUATION
"Patient: Denver Ames    Procedure Summary     Date: 03/31/22 Room / Location: [facility] OR 06 / [facility] MAIN OR    Anesthesia Start: 1014 Anesthesia Stop: 1528    Procedure: HEAD NECK DEBRIDEMENT, scalp flap closure of open wound, with full thickness skin graft to head. (N/A ) Diagnosis:       Personal history of other malignant neoplasm of skin      Open wound of scalp, unspecified open wound type, subsequent encounter      (Personal history of other malignant neoplasm of skin [Z85.828])      (Open wound of scalp, unspecified open wound type, subsequent encounter [S01.00XD])    Surgeons: Shakir Romero MD Provider: Jose Carlos Benítez MD    Anesthesia Type: general ASA Status: 3          Anesthesia Type: general    Vitals  Vitals Value Taken Time   /77 03/31/22 1841   Temp 36.4 °C (97.5 °F) 03/31/22 1845   Pulse 108 03/31/22 1845   Resp 16 03/31/22 1825   SpO2 97 % 03/31/22 1845           Post Anesthesia Care and Evaluation    Patient location during evaluation: PACU  Patient participation: complete - patient participated  Level of consciousness: awake  Pain management: adequate  Airway patency: patent  Anesthetic complications: No anesthetic complications    Cardiovascular status: acceptable  Respiratory status: acceptable  Hydration status: acceptable    Comments: /77   Pulse 108   Temp 36.4 °C (97.5 °F) (Oral)   Resp 16   Ht 175.3 cm (69\")   Wt 68.5 kg (151 lb 1.6 oz)   SpO2 97%   BMI 22.31 kg/m²         "

## 2022-03-31 NOTE — ANESTHESIA PREPROCEDURE EVALUATION
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        CODE STATUS:

## 2022-04-04 NOTE — OUTREACH NOTE
Prep Survey    Flowsheet Row Responses   [facility] facility patient discharged from? [place]   Is LACE score < 7 ? No   Emergency Room discharge w/ pulse ox? No   Eligibility Readm Mgmt   Discharge diagnosis Personal history of other malignant neoplasm of skin    Does the patient have one of the following disease processes/diagnoses(primary or secondary)? General Surgery   Does the patient have Home health ordered? Yes   What is the Home health agency?  [facility]    Is there a DME ordered? No   Prep survey completed? Yes          TIERRA QUIROGA - Registered Nurse

## 2022-04-06 NOTE — OUTREACH NOTE
General Surgery Week 1 Survey    Flowsheet Row Responses   [facility] patient discharged from? [place]   Does the patient have one of the following disease processes/diagnoses(primary or secondary)? General Surgery   Week 1 attempt successful? Yes   Call start time 0944   Revoke Decline to participate   Call end time 0944   Is patient permission given to speak with other caregiver? Yes   List who call center can speak with Lore Echeverria Registered Nurse

## 2022-10-28 PROBLEM — G96.08 TRAUMATIC SUBDURAL HYGROMA: Status: ACTIVE | Noted: 2022-01-01

## 2022-10-28 PROBLEM — G96.08 SUBDURAL HYGROMA: Status: ACTIVE | Noted: 2022-01-01

## 2022-11-01 NOTE — TELEPHONE ENCOUNTER
I called and spoke with daughter Lore.  I scheduled patients CT for 11/18 @ 10:30 ([place]) and to follow up with Dr. Fleming (11/18 @ 1pm) on the same day.  I gave daughter CT appointment info along with location.  Patients daughter understood.

## 2022-11-07 NOTE — TELEPHONE ENCOUNTER
Caller: DARSHAN ENCARNACION     Relationship: DAUGHTER (POA PAPERS LOCATED IN DOCUMENTS)    Best call back number: [phone]    What was the call regarding: PTS DAUGHTER CALLED AND STATES THE PATIENT IS VERY ANXIOUS ABOUT HAVING HIS CT COMPLETED 11/18/2022.  DARSHAN WOULD LIKE TO KNOW IF THERE IS ANYTHING SHE CAN GIVE HIM TO HELP HIM RELAX A LITTLE BEFORE HAVING CT COMPLETED    Do you require a callback:     PLEASE CALL DARSHAN    THANK YOU

## 2022-11-08 NOTE — TELEPHONE ENCOUNTER
I called and LVM for patients daughter Lore to call back. Anyone who answers the phone can give Dr. Fleming's response. HUB ok to give message.

## 2022-11-08 NOTE — TELEPHONE ENCOUNTER
DAUGHTER CALLED BACK, AND MESSAGE READ TO HER THAT DR WOLFE DID NOT WISH TO PRESCRIBE ANY TYPE OF SEDATIVE, DAUGHTER VOICED UNDERSTANDING

## 2022-11-18 NOTE — PROGRESS NOTES
Patient ID: Denver Ames is a 89 y.o. male is an established patient with traumatic subdural hydroma who has previously undergone CT on 11/18/22. Patient states he has been doing fine.      Subjective:     History of Present Illness  Denies any headache or new neurological symptoms.  He has been doing well since leaving the hospital and has been off his Eliquis this time.  His bruising has started to resolve and his mental status is started to clear up while at home.      Review of Systems   Constitutional: Negative for appetite change and fatigue.   Eyes: Negative for visual disturbance.   Musculoskeletal: Negative.    Allergic/Immunologic: Negative.    Neurological: Negative for dizziness and headaches.   Psychiatric/Behavioral: Negative for confusion and sleep disturbance.        While in the room and during my examination of the patient I wore a mask and eye protection.  I washed my hands before and after this patient encounter.  The patient was also wearing a mask.    The following portions of the patient's history were reviewed and updated as appropriate: allergies, current medications, past family history, past medical history, past social history, past surgical history and problem list.     Objective:    Vitals:    11/18/22 1312   BP: 118/78   Pulse: 67   Resp: 18     Body mass index is 22.15 kg/m².    Physical Exam  Constitutional:       Appearance: Normal appearance.   HENT:      Head: Normocephalic and atraumatic.   Eyes:      Extraocular Movements: Extraocular movements intact.      Conjunctiva/sclera: Conjunctivae normal.      Pupils: Pupils are equal, round, and reactive to light.   Cardiovascular:      Rate and Rhythm: Normal rate and regular rhythm.      Pulses: Normal pulses.   Pulmonary:      Breath sounds: Normal breath sounds.   Abdominal:      Palpations: Abdomen is soft.   Musculoskeletal:         General: Normal range of motion.      Cervical back: Normal range of motion and neck supple.    Skin:     General: Skin is warm and dry.   Neurological:      Mental Status: He is alert and oriented to person, place, and time.      Cranial Nerves: Cranial nerves are intact and 2-12 are intact.      Motor: Motor function is intact. No weakness or atrophy.      Coordination: Coordination is intact. Romberg sign negative. Romberg Test normal.      Gait: Gait is intact. Gait normal.      Deep Tendon Reflexes: Reflexes are normal and symmetric.      Reflex Scores:       Tricep reflexes are 2+ on the right side and 2+ on the left side.       Bicep reflexes are 2+ on the right side and 2+ on the left side.       Brachioradialis reflexes are 2+ on the right side and 2+ on the left side.       Patellar reflexes are 2+ on the right side and 2+ on the left side.       Achilles reflexes are 2+ on the right side and 2+ on the left side.  Psychiatric:         Speech: Speech normal.       Neurologic Exam     Mental Status   Oriented to person, place, and time.   Attention: normal. Concentration: normal.   Speech: speech is normal   Level of consciousness: alert    Cranial Nerves   Cranial nerves II through XII intact.     CN III, IV, VI   Pupils are equal, round, and reactive to light.    Motor Exam   Muscle bulk: normal  Overall muscle tone: normal    Strength   Strength 5/5 except as noted.     Sensory Exam   Light touch normal.     Gait, Coordination, and Reflexes     Gait  Gait: normal    Coordination   Romberg: negative    Reflexes   Reflexes 2+ except as noted.   Right brachioradialis: 2+  Left brachioradialis: 2+  Right biceps: 2+  Left biceps: 2+  Right triceps: 2+  Left triceps: 2+  Right patellar: 2+  Left patellar: 2+  Right achilles: 2+  Left achilles: 2+       Results: CT head without contrast shows subtle decrease in his left sided hygroma fluid collection.  No acute hemorrhage.    Assessment/Plan: Denver is doing well.  There is no evidence of any residual hematoma and his hygroma seems to be improving.  I do  think it is safe for him to restart his Eliquis if medically necessary, however based on his age and fall risk I would recommend that he just remain on aspirin alone, but I will defer that decision ultimately to his cardiologist.       Diagnoses and all orders for this visit:    1. Subdural hygroma (Primary)                Tashi Fleming MD  11/18/22  13:51 EST

## 2023-01-01 ENCOUNTER — APPOINTMENT (OUTPATIENT)
Dept: GENERAL RADIOLOGY | Facility: HOSPITAL | Age: 88
DRG: 193 | End: 2023-01-01
Payer: MEDICARE

## 2023-01-01 ENCOUNTER — OFFICE VISIT (OUTPATIENT)
Dept: WOUND CARE | Facility: HOSPITAL | Age: 88
End: 2023-01-01
Payer: MEDICARE

## 2023-01-01 ENCOUNTER — APPOINTMENT (OUTPATIENT)
Dept: CT IMAGING | Facility: HOSPITAL | Age: 88
DRG: 193 | End: 2023-01-01
Payer: MEDICARE

## 2023-01-01 ENCOUNTER — HOSPITAL ENCOUNTER (INPATIENT)
Facility: HOSPITAL | Age: 88
LOS: 9 days | Discharge: HOSPICE | DRG: 193 | End: 2023-02-17
Attending: EMERGENCY MEDICINE | Admitting: INTERNAL MEDICINE
Payer: MEDICARE

## 2023-01-01 ENCOUNTER — HOSPITAL ENCOUNTER (INPATIENT)
Facility: HOSPITAL | Age: 88
LOS: 4 days | DRG: 951 | End: 2023-02-21
Attending: INTERNAL MEDICINE | Admitting: INTERNAL MEDICINE
Payer: COMMERCIAL

## 2023-01-01 VITALS
BODY MASS INDEX: 20.55 KG/M2 | TEMPERATURE: 100.2 F | RESPIRATION RATE: 18 BRPM | WEIGHT: 143.52 LBS | HEIGHT: 70 IN | SYSTOLIC BLOOD PRESSURE: 109 MMHG | OXYGEN SATURATION: 97 % | DIASTOLIC BLOOD PRESSURE: 65 MMHG | HEART RATE: 100 BPM

## 2023-01-01 VITALS
WEIGHT: 137 LBS | HEIGHT: 70 IN | OXYGEN SATURATION: 100 % | DIASTOLIC BLOOD PRESSURE: 43 MMHG | TEMPERATURE: 102.1 F | BODY MASS INDEX: 19.61 KG/M2 | SYSTOLIC BLOOD PRESSURE: 68 MMHG

## 2023-01-01 DIAGNOSIS — J18.9 PNEUMONIA OF BOTH LUNGS DUE TO INFECTIOUS ORGANISM, UNSPECIFIED PART OF LUNG: ICD-10-CM

## 2023-01-01 DIAGNOSIS — I50.9 ACUTE CONGESTIVE HEART FAILURE, UNSPECIFIED HEART FAILURE TYPE: Primary | ICD-10-CM

## 2023-01-01 LAB
ALBUMIN SERPL-MCNC: 4 G/DL (ref 3.5–5.2)
ALBUMIN/GLOB SERPL: 1.3 G/DL
ALP SERPL-CCNC: 170 U/L (ref 39–117)
ALT SERPL W P-5'-P-CCNC: 15 U/L (ref 1–41)
ANION GAP SERPL CALCULATED.3IONS-SCNC: 11 MMOL/L (ref 5–15)
ANION GAP SERPL CALCULATED.3IONS-SCNC: 12 MMOL/L (ref 5–15)
ANION GAP SERPL CALCULATED.3IONS-SCNC: 6.8 MMOL/L (ref 5–15)
ANION GAP SERPL CALCULATED.3IONS-SCNC: 8 MMOL/L (ref 5–15)
ANION GAP SERPL CALCULATED.3IONS-SCNC: 9 MMOL/L (ref 5–15)
ANION GAP SERPL CALCULATED.3IONS-SCNC: 9.3 MMOL/L (ref 5–15)
ARTERIAL PATENCY WRIST A: ABNORMAL
AST SERPL-CCNC: 24 U/L (ref 1–40)
ATMOSPHERIC PRESS: 741.5 MMHG
B PARAPERT DNA SPEC QL NAA+PROBE: NOT DETECTED
B PERT DNA SPEC QL NAA+PROBE: NOT DETECTED
BACTERIA SPEC AEROBE CULT: NORMAL
BACTERIA SPEC AEROBE CULT: NORMAL
BASE EXCESS BLDA CALC-SCNC: 6.2 MMOL/L (ref 0–2)
BASOPHILS # BLD AUTO: 0.03 10*3/MM3 (ref 0–0.2)
BASOPHILS # BLD AUTO: 0.03 10*3/MM3 (ref 0–0.2)
BASOPHILS # BLD AUTO: 0.04 10*3/MM3 (ref 0–0.2)
BASOPHILS # BLD AUTO: 0.06 10*3/MM3 (ref 0–0.2)
BASOPHILS NFR BLD AUTO: 0.3 % (ref 0–1.5)
BASOPHILS NFR BLD AUTO: 0.4 % (ref 0–1.5)
BASOPHILS NFR BLD AUTO: 0.5 % (ref 0–1.5)
BASOPHILS NFR BLD AUTO: 0.5 % (ref 0–1.5)
BDY SITE: ABNORMAL
BILIRUB SERPL-MCNC: 1.2 MG/DL (ref 0–1.2)
BILIRUB UR QL STRIP: NEGATIVE
BUN SERPL-MCNC: 18 MG/DL (ref 8–23)
BUN SERPL-MCNC: 19 MG/DL (ref 8–23)
BUN SERPL-MCNC: 20 MG/DL (ref 8–23)
BUN SERPL-MCNC: 24 MG/DL (ref 8–23)
BUN/CREAT SERPL: 14.9 (ref 7–25)
BUN/CREAT SERPL: 17.4 (ref 7–25)
BUN/CREAT SERPL: 17.5 (ref 7–25)
BUN/CREAT SERPL: 17.9 (ref 7–25)
BUN/CREAT SERPL: 18.9 (ref 7–25)
BUN/CREAT SERPL: 22 (ref 7–25)
C PNEUM DNA NPH QL NAA+NON-PROBE: NOT DETECTED
CALCIUM SPEC-SCNC: 8.6 MG/DL (ref 8.2–9.6)
CALCIUM SPEC-SCNC: 8.7 MG/DL (ref 8.2–9.6)
CALCIUM SPEC-SCNC: 8.8 MG/DL (ref 8.2–9.6)
CALCIUM SPEC-SCNC: 8.8 MG/DL (ref 8.2–9.6)
CALCIUM SPEC-SCNC: 9.2 MG/DL (ref 8.2–9.6)
CALCIUM SPEC-SCNC: 9.2 MG/DL (ref 8.2–9.6)
CHLORIDE SERPL-SCNC: 100 MMOL/L (ref 98–107)
CHLORIDE SERPL-SCNC: 101 MMOL/L (ref 98–107)
CHLORIDE SERPL-SCNC: 101 MMOL/L (ref 98–107)
CHLORIDE SERPL-SCNC: 103 MMOL/L (ref 98–107)
CHLORIDE SERPL-SCNC: 104 MMOL/L (ref 98–107)
CHLORIDE SERPL-SCNC: 105 MMOL/L (ref 98–107)
CLARITY UR: CLEAR
CO2 SERPL-SCNC: 27 MMOL/L (ref 22–29)
CO2 SERPL-SCNC: 30 MMOL/L (ref 22–29)
CO2 SERPL-SCNC: 31 MMOL/L (ref 22–29)
CO2 SERPL-SCNC: 32 MMOL/L (ref 22–29)
CO2 SERPL-SCNC: 32.2 MMOL/L (ref 22–29)
CO2 SERPL-SCNC: 35.7 MMOL/L (ref 22–29)
COLOR UR: YELLOW
CREAT SERPL-MCNC: 0.82 MG/DL (ref 0.76–1.27)
CREAT SERPL-MCNC: 1.06 MG/DL (ref 0.76–1.27)
CREAT SERPL-MCNC: 1.09 MG/DL (ref 0.76–1.27)
CREAT SERPL-MCNC: 1.12 MG/DL (ref 0.76–1.27)
CREAT SERPL-MCNC: 1.34 MG/DL (ref 0.76–1.27)
CREAT SERPL-MCNC: 1.37 MG/DL (ref 0.76–1.27)
D-LACTATE SERPL-SCNC: 1.8 MMOL/L (ref 0.5–2)
D-LACTATE SERPL-SCNC: 3.9 MMOL/L (ref 0.5–2)
DEPRECATED RDW RBC AUTO: 43.8 FL (ref 37–54)
DEPRECATED RDW RBC AUTO: 44.5 FL (ref 37–54)
DEPRECATED RDW RBC AUTO: 45.4 FL (ref 37–54)
DEPRECATED RDW RBC AUTO: 46.3 FL (ref 37–54)
DEPRECATED RDW RBC AUTO: 46.8 FL (ref 37–54)
DEPRECATED RDW RBC AUTO: 47.1 FL (ref 37–54)
EGFRCR SERPLBLD CKD-EPI 2021: 46.9 ML/MIN/1.73
EGFRCR SERPLBLD CKD-EPI 2021: 48.2 ML/MIN/1.73
EGFRCR SERPLBLD CKD-EPI 2021: 59.7 ML/MIN/1.73
EGFRCR SERPLBLD CKD-EPI 2021: 61.7 ML/MIN/1.73
EGFRCR SERPLBLD CKD-EPI 2021: 63.8 ML/MIN/1.73
EGFRCR SERPLBLD CKD-EPI 2021: 79.9 ML/MIN/1.73
EOSINOPHIL # BLD AUTO: 0.05 10*3/MM3 (ref 0–0.4)
EOSINOPHIL # BLD AUTO: 0.05 10*3/MM3 (ref 0–0.4)
EOSINOPHIL # BLD AUTO: 0.1 10*3/MM3 (ref 0–0.4)
EOSINOPHIL # BLD AUTO: 0.1 10*3/MM3 (ref 0–0.4)
EOSINOPHIL # BLD AUTO: 0.29 10*3/MM3 (ref 0–0.4)
EOSINOPHIL # BLD AUTO: 0.34 10*3/MM3 (ref 0–0.4)
EOSINOPHIL NFR BLD AUTO: 0.5 % (ref 0.3–6.2)
EOSINOPHIL NFR BLD AUTO: 0.5 % (ref 0.3–6.2)
EOSINOPHIL NFR BLD AUTO: 0.6 % (ref 0.3–6.2)
EOSINOPHIL NFR BLD AUTO: 1.2 % (ref 0.3–6.2)
EOSINOPHIL NFR BLD AUTO: 4.1 % (ref 0.3–6.2)
EOSINOPHIL NFR BLD AUTO: 4.2 % (ref 0.3–6.2)
ERYTHROCYTE [DISTWIDTH] IN BLOOD BY AUTOMATED COUNT: 13.7 % (ref 12.3–15.4)
ERYTHROCYTE [DISTWIDTH] IN BLOOD BY AUTOMATED COUNT: 13.8 % (ref 12.3–15.4)
ERYTHROCYTE [DISTWIDTH] IN BLOOD BY AUTOMATED COUNT: 14 % (ref 12.3–15.4)
ERYTHROCYTE [DISTWIDTH] IN BLOOD BY AUTOMATED COUNT: 14.3 % (ref 12.3–15.4)
FLUAV SUBTYP SPEC NAA+PROBE: NOT DETECTED
FLUBV RNA ISLT QL NAA+PROBE: NOT DETECTED
GAS FLOW AIRWAY: 15 LPM
GEN 5 2HR TROPONIN T REFLEX: 33 NG/L
GLOBULIN UR ELPH-MCNC: 3.2 GM/DL
GLUCOSE SERPL-MCNC: 101 MG/DL (ref 65–99)
GLUCOSE SERPL-MCNC: 109 MG/DL (ref 65–99)
GLUCOSE SERPL-MCNC: 113 MG/DL (ref 65–99)
GLUCOSE SERPL-MCNC: 142 MG/DL (ref 65–99)
GLUCOSE SERPL-MCNC: 79 MG/DL (ref 65–99)
GLUCOSE SERPL-MCNC: 93 MG/DL (ref 65–99)
GLUCOSE UR STRIP-MCNC: NEGATIVE MG/DL
HADV DNA SPEC NAA+PROBE: NOT DETECTED
HCO3 BLDA-SCNC: 31.6 MMOL/L (ref 22–28)
HCOV 229E RNA SPEC QL NAA+PROBE: NOT DETECTED
HCOV HKU1 RNA SPEC QL NAA+PROBE: NOT DETECTED
HCOV NL63 RNA SPEC QL NAA+PROBE: NOT DETECTED
HCOV OC43 RNA SPEC QL NAA+PROBE: NOT DETECTED
HCT VFR BLD AUTO: 37.2 % (ref 37.5–51)
HCT VFR BLD AUTO: 38.5 % (ref 37.5–51)
HCT VFR BLD AUTO: 39.5 % (ref 37.5–51)
HCT VFR BLD AUTO: 40.1 % (ref 37.5–51)
HCT VFR BLD AUTO: 40.3 % (ref 37.5–51)
HCT VFR BLD AUTO: 45.4 % (ref 37.5–51)
HGB BLD-MCNC: 11.4 G/DL (ref 13–17.7)
HGB BLD-MCNC: 12.5 G/DL (ref 13–17.7)
HGB BLD-MCNC: 12.6 G/DL (ref 13–17.7)
HGB BLD-MCNC: 12.6 G/DL (ref 13–17.7)
HGB BLD-MCNC: 13.1 G/DL (ref 13–17.7)
HGB BLD-MCNC: 14.3 G/DL (ref 13–17.7)
HGB UR QL STRIP.AUTO: NEGATIVE
HMPV RNA NPH QL NAA+NON-PROBE: DETECTED
HOLD SPECIMEN: NORMAL
HOLD SPECIMEN: NORMAL
HPIV1 RNA ISLT QL NAA+PROBE: NOT DETECTED
HPIV2 RNA SPEC QL NAA+PROBE: NOT DETECTED
HPIV3 RNA NPH QL NAA+PROBE: NOT DETECTED
HPIV4 P GENE NPH QL NAA+PROBE: NOT DETECTED
IMM GRANULOCYTES # BLD AUTO: 0.04 10*3/MM3 (ref 0–0.05)
IMM GRANULOCYTES # BLD AUTO: 0.05 10*3/MM3 (ref 0–0.05)
IMM GRANULOCYTES # BLD AUTO: 0.06 10*3/MM3 (ref 0–0.05)
IMM GRANULOCYTES # BLD AUTO: 0.06 10*3/MM3 (ref 0–0.05)
IMM GRANULOCYTES # BLD AUTO: 0.08 10*3/MM3 (ref 0–0.05)
IMM GRANULOCYTES # BLD AUTO: 0.11 10*3/MM3 (ref 0–0.05)
IMM GRANULOCYTES NFR BLD AUTO: 0.5 % (ref 0–0.5)
IMM GRANULOCYTES NFR BLD AUTO: 0.6 % (ref 0–0.5)
IMM GRANULOCYTES NFR BLD AUTO: 0.7 % (ref 0–0.5)
KETONES UR QL STRIP: ABNORMAL
LEUKOCYTE ESTERASE UR QL STRIP.AUTO: NEGATIVE
LYMPHOCYTES # BLD AUTO: 1.43 10*3/MM3 (ref 0.7–3.1)
LYMPHOCYTES # BLD AUTO: 1.46 10*3/MM3 (ref 0.7–3.1)
LYMPHOCYTES # BLD AUTO: 1.6 10*3/MM3 (ref 0.7–3.1)
LYMPHOCYTES # BLD AUTO: 1.63 10*3/MM3 (ref 0.7–3.1)
LYMPHOCYTES # BLD AUTO: 1.73 10*3/MM3 (ref 0.7–3.1)
LYMPHOCYTES # BLD AUTO: 1.89 10*3/MM3 (ref 0.7–3.1)
LYMPHOCYTES NFR BLD AUTO: 12.3 % (ref 19.6–45.3)
LYMPHOCYTES NFR BLD AUTO: 16 % (ref 19.6–45.3)
LYMPHOCYTES NFR BLD AUTO: 17.2 % (ref 19.6–45.3)
LYMPHOCYTES NFR BLD AUTO: 17.3 % (ref 19.6–45.3)
LYMPHOCYTES NFR BLD AUTO: 20.2 % (ref 19.6–45.3)
LYMPHOCYTES NFR BLD AUTO: 20.3 % (ref 19.6–45.3)
M PNEUMO IGG SER IA-ACNC: NOT DETECTED
MCH RBC QN AUTO: 27.7 PG (ref 26.6–33)
MCH RBC QN AUTO: 28.6 PG (ref 26.6–33)
MCH RBC QN AUTO: 28.7 PG (ref 26.6–33)
MCH RBC QN AUTO: 28.9 PG (ref 26.6–33)
MCH RBC QN AUTO: 28.9 PG (ref 26.6–33)
MCH RBC QN AUTO: 29.1 PG (ref 26.6–33)
MCHC RBC AUTO-ENTMCNC: 30.6 G/DL (ref 31.5–35.7)
MCHC RBC AUTO-ENTMCNC: 31.4 G/DL (ref 31.5–35.7)
MCHC RBC AUTO-ENTMCNC: 31.5 G/DL (ref 31.5–35.7)
MCHC RBC AUTO-ENTMCNC: 31.9 G/DL (ref 31.5–35.7)
MCHC RBC AUTO-ENTMCNC: 32.5 G/DL (ref 31.5–35.7)
MCHC RBC AUTO-ENTMCNC: 32.5 G/DL (ref 31.5–35.7)
MCV RBC AUTO: 88 FL (ref 79–97)
MCV RBC AUTO: 88.9 FL (ref 79–97)
MCV RBC AUTO: 90 FL (ref 79–97)
MCV RBC AUTO: 90.5 FL (ref 79–97)
MCV RBC AUTO: 92 FL (ref 79–97)
MCV RBC AUTO: 92.3 FL (ref 79–97)
MODALITY: ABNORMAL
MONOCYTES # BLD AUTO: 0.69 10*3/MM3 (ref 0.1–0.9)
MONOCYTES # BLD AUTO: 0.7 10*3/MM3 (ref 0.1–0.9)
MONOCYTES # BLD AUTO: 0.7 10*3/MM3 (ref 0.1–0.9)
MONOCYTES # BLD AUTO: 0.76 10*3/MM3 (ref 0.1–0.9)
MONOCYTES # BLD AUTO: 0.9 10*3/MM3 (ref 0.1–0.9)
MONOCYTES # BLD AUTO: 1.04 10*3/MM3 (ref 0.1–0.9)
MONOCYTES NFR BLD AUTO: 6.7 % (ref 5–12)
MONOCYTES NFR BLD AUTO: 7.4 % (ref 5–12)
MONOCYTES NFR BLD AUTO: 8.3 % (ref 5–12)
MONOCYTES NFR BLD AUTO: 8.7 % (ref 5–12)
MONOCYTES NFR BLD AUTO: 9 % (ref 5–12)
MONOCYTES NFR BLD AUTO: 9.9 % (ref 5–12)
NEUTROPHILS NFR BLD AUTO: 12.21 10*3/MM3 (ref 1.7–7)
NEUTROPHILS NFR BLD AUTO: 4.55 10*3/MM3 (ref 1.7–7)
NEUTROPHILS NFR BLD AUTO: 5.3 10*3/MM3 (ref 1.7–7)
NEUTROPHILS NFR BLD AUTO: 6.04 10*3/MM3 (ref 1.7–7)
NEUTROPHILS NFR BLD AUTO: 6.86 10*3/MM3 (ref 1.7–7)
NEUTROPHILS NFR BLD AUTO: 64.6 % (ref 42.7–76)
NEUTROPHILS NFR BLD AUTO: 65.9 % (ref 42.7–76)
NEUTROPHILS NFR BLD AUTO: 71.3 % (ref 42.7–76)
NEUTROPHILS NFR BLD AUTO: 73.9 % (ref 42.7–76)
NEUTROPHILS NFR BLD AUTO: 74.2 % (ref 42.7–76)
NEUTROPHILS NFR BLD AUTO: 79.3 % (ref 42.7–76)
NEUTROPHILS NFR BLD AUTO: 8.04 10*3/MM3 (ref 1.7–7)
NITRITE UR QL STRIP: NEGATIVE
NRBC BLD AUTO-RTO: 0 /100 WBC (ref 0–0.2)
NT-PROBNP SERPL-MCNC: 9896 PG/ML (ref 0–1800)
PCO2 BLDA: 47.3 MM HG (ref 35–45)
PH BLDA: 7.43 PH UNITS (ref 7.35–7.45)
PH UR STRIP.AUTO: 5.5 [PH] (ref 5–8)
PLATELET # BLD AUTO: 170 10*3/MM3 (ref 140–450)
PLATELET # BLD AUTO: 179 10*3/MM3 (ref 140–450)
PLATELET # BLD AUTO: 187 10*3/MM3 (ref 140–450)
PLATELET # BLD AUTO: 193 10*3/MM3 (ref 140–450)
PLATELET # BLD AUTO: 193 10*3/MM3 (ref 140–450)
PLATELET # BLD AUTO: 224 10*3/MM3 (ref 140–450)
PMV BLD AUTO: 10.2 FL (ref 6–12)
PMV BLD AUTO: 10.2 FL (ref 6–12)
PMV BLD AUTO: 10.4 FL (ref 6–12)
PMV BLD AUTO: 10.5 FL (ref 6–12)
PMV BLD AUTO: 10.6 FL (ref 6–12)
PMV BLD AUTO: 9.8 FL (ref 6–12)
PO2 BLDA: 431.8 MM HG (ref 80–100)
POTASSIUM SERPL-SCNC: 3.3 MMOL/L (ref 3.5–5.2)
POTASSIUM SERPL-SCNC: 3.4 MMOL/L (ref 3.5–5.2)
POTASSIUM SERPL-SCNC: 3.5 MMOL/L (ref 3.5–5.2)
POTASSIUM SERPL-SCNC: 4 MMOL/L (ref 3.5–5.2)
POTASSIUM SERPL-SCNC: 4.4 MMOL/L (ref 3.5–5.2)
POTASSIUM SERPL-SCNC: 5.1 MMOL/L (ref 3.5–5.2)
PROT SERPL-MCNC: 7.2 G/DL (ref 6–8.5)
PROT UR QL STRIP: ABNORMAL
QT INTERVAL: 361 MS
RBC # BLD AUTO: 4.11 10*6/MM3 (ref 4.14–5.8)
RBC # BLD AUTO: 4.33 10*6/MM3 (ref 4.14–5.8)
RBC # BLD AUTO: 4.36 10*6/MM3 (ref 4.14–5.8)
RBC # BLD AUTO: 4.39 10*6/MM3 (ref 4.14–5.8)
RBC # BLD AUTO: 4.58 10*6/MM3 (ref 4.14–5.8)
RBC # BLD AUTO: 4.92 10*6/MM3 (ref 4.14–5.8)
RHINOVIRUS RNA SPEC NAA+PROBE: NOT DETECTED
RSV RNA NPH QL NAA+NON-PROBE: NOT DETECTED
SAO2 % BLDCOA: 100 % (ref 92–99)
SARS-COV-2 RNA NPH QL NAA+NON-PROBE: NOT DETECTED
SODIUM SERPL-SCNC: 139 MMOL/L (ref 136–145)
SODIUM SERPL-SCNC: 142 MMOL/L (ref 136–145)
SODIUM SERPL-SCNC: 143 MMOL/L (ref 136–145)
SODIUM SERPL-SCNC: 144 MMOL/L (ref 136–145)
SODIUM SERPL-SCNC: 144 MMOL/L (ref 136–145)
SODIUM SERPL-SCNC: 146 MMOL/L (ref 136–145)
SP GR UR STRIP: 1.02 (ref 1–1.03)
TOTAL RATE: 16 BREATHS/MINUTE
TROPONIN T DELTA: -9 NG/L
TROPONIN T SERPL HS-MCNC: 42 NG/L
UROBILINOGEN UR QL STRIP: ABNORMAL
WBC NRBC COR # BLD: 10.83 10*3/MM3 (ref 3.4–10.8)
WBC NRBC COR # BLD: 15.41 10*3/MM3 (ref 3.4–10.8)
WBC NRBC COR # BLD: 7.05 10*3/MM3 (ref 3.4–10.8)
WBC NRBC COR # BLD: 8.05 10*3/MM3 (ref 3.4–10.8)
WBC NRBC COR # BLD: 8.46 10*3/MM3 (ref 3.4–10.8)
WBC NRBC COR # BLD: 9.3 10*3/MM3 (ref 3.4–10.8)
WHOLE BLOOD HOLD COAG: NORMAL
WHOLE BLOOD HOLD SPECIMEN: NORMAL

## 2023-01-01 PROCEDURE — 25010000002 LORAZEPAM PER 2 MG: Performed by: NURSE PRACTITIONER

## 2023-01-01 PROCEDURE — 84484 ASSAY OF TROPONIN QUANT: CPT

## 2023-01-01 PROCEDURE — 36600 WITHDRAWAL OF ARTERIAL BLOOD: CPT

## 2023-01-01 PROCEDURE — 0202U NFCT DS 22 TRGT SARS-COV-2: CPT | Performed by: NURSE PRACTITIONER

## 2023-01-01 PROCEDURE — 80048 BASIC METABOLIC PNL TOTAL CA: CPT | Performed by: INTERNAL MEDICINE

## 2023-01-01 PROCEDURE — 25010000002 MORPHINE PER 10 MG: Performed by: INTERNAL MEDICINE

## 2023-01-01 PROCEDURE — 17250 CHEM CAUT OF GRANLTJ TISSUE: CPT

## 2023-01-01 PROCEDURE — 92611 MOTION FLUOROSCOPY/SWALLOW: CPT

## 2023-01-01 PROCEDURE — 25010000002 CEFTRIAXONE PER 250 MG: Performed by: EMERGENCY MEDICINE

## 2023-01-01 PROCEDURE — 80048 BASIC METABOLIC PNL TOTAL CA: CPT | Performed by: NURSE PRACTITIONER

## 2023-01-01 PROCEDURE — 25010000002 FUROSEMIDE PER 20 MG: Performed by: EMERGENCY MEDICINE

## 2023-01-01 PROCEDURE — 92610 EVALUATE SWALLOWING FUNCTION: CPT

## 2023-01-01 PROCEDURE — 70450 CT HEAD/BRAIN W/O DYE: CPT

## 2023-01-01 PROCEDURE — 93005 ELECTROCARDIOGRAM TRACING: CPT | Performed by: EMERGENCY MEDICINE

## 2023-01-01 PROCEDURE — 97110 THERAPEUTIC EXERCISES: CPT

## 2023-01-01 PROCEDURE — 97162 PT EVAL MOD COMPLEX 30 MIN: CPT

## 2023-01-01 PROCEDURE — 97530 THERAPEUTIC ACTIVITIES: CPT

## 2023-01-01 PROCEDURE — 85025 COMPLETE CBC W/AUTO DIFF WBC: CPT | Performed by: INTERNAL MEDICINE

## 2023-01-01 PROCEDURE — 99285 EMERGENCY DEPT VISIT HI MDM: CPT

## 2023-01-01 PROCEDURE — 84484 ASSAY OF TROPONIN QUANT: CPT | Performed by: EMERGENCY MEDICINE

## 2023-01-01 PROCEDURE — 25010000002 MORPHINE PER 10 MG: Performed by: NURSE PRACTITIONER

## 2023-01-01 PROCEDURE — 25010000002 LORAZEPAM PER 2 MG: Performed by: INTERNAL MEDICINE

## 2023-01-01 PROCEDURE — 25010000002 CEFTRIAXONE PER 250 MG: Performed by: NURSE PRACTITIONER

## 2023-01-01 PROCEDURE — 71045 X-RAY EXAM CHEST 1 VIEW: CPT

## 2023-01-01 PROCEDURE — 92526 ORAL FUNCTION THERAPY: CPT

## 2023-01-01 PROCEDURE — 93010 ELECTROCARDIOGRAM REPORT: CPT | Performed by: INTERNAL MEDICINE

## 2023-01-01 PROCEDURE — 85025 COMPLETE CBC W/AUTO DIFF WBC: CPT

## 2023-01-01 PROCEDURE — 83605 ASSAY OF LACTIC ACID: CPT | Performed by: EMERGENCY MEDICINE

## 2023-01-01 PROCEDURE — 87040 BLOOD CULTURE FOR BACTERIA: CPT | Performed by: EMERGENCY MEDICINE

## 2023-01-01 PROCEDURE — 74230 X-RAY XM SWLNG FUNCJ C+: CPT

## 2023-01-01 PROCEDURE — 93005 ELECTROCARDIOGRAM TRACING: CPT

## 2023-01-01 PROCEDURE — 71250 CT THORAX DX C-: CPT

## 2023-01-01 PROCEDURE — 83880 ASSAY OF NATRIURETIC PEPTIDE: CPT

## 2023-01-01 PROCEDURE — P9612 CATHETERIZE FOR URINE SPEC: HCPCS

## 2023-01-01 PROCEDURE — 99284 EMERGENCY DEPT VISIT MOD MDM: CPT

## 2023-01-01 PROCEDURE — 25010000002 FUROSEMIDE PER 20 MG: Performed by: INTERNAL MEDICINE

## 2023-01-01 PROCEDURE — 81003 URINALYSIS AUTO W/O SCOPE: CPT | Performed by: EMERGENCY MEDICINE

## 2023-01-01 PROCEDURE — 82803 BLOOD GASES ANY COMBINATION: CPT

## 2023-01-01 PROCEDURE — 99221 1ST HOSP IP/OBS SF/LOW 40: CPT | Performed by: SURGERY

## 2023-01-01 PROCEDURE — 85025 COMPLETE CBC W/AUTO DIFF WBC: CPT | Performed by: NURSE PRACTITIONER

## 2023-01-01 PROCEDURE — 36415 COLL VENOUS BLD VENIPUNCTURE: CPT

## 2023-01-01 PROCEDURE — 80053 COMPREHEN METABOLIC PANEL: CPT

## 2023-01-01 RX ORDER — MORPHINE SULFATE 2 MG/ML
2 INJECTION, SOLUTION INTRAMUSCULAR; INTRAVENOUS
Status: DISPENSED | OUTPATIENT
Start: 2023-01-01 | End: 2023-01-01

## 2023-01-01 RX ORDER — SCOLOPAMINE TRANSDERMAL SYSTEM 1 MG/1
1 PATCH, EXTENDED RELEASE TRANSDERMAL
Status: DISCONTINUED | OUTPATIENT
Start: 2023-01-01 | End: 2023-01-01 | Stop reason: HOSPADM

## 2023-01-01 RX ORDER — LORAZEPAM 2 MG/ML
2 INJECTION INTRAMUSCULAR
Status: CANCELLED | OUTPATIENT
Start: 2023-01-01 | End: 2023-02-25

## 2023-01-01 RX ORDER — LORAZEPAM 2 MG/ML
0.5 INJECTION INTRAMUSCULAR
Status: DISCONTINUED | OUTPATIENT
Start: 2023-01-01 | End: 2023-01-01 | Stop reason: HOSPADM

## 2023-01-01 RX ORDER — MORPHINE SULFATE 20 MG/ML
5 SOLUTION ORAL
Status: DISCONTINUED | OUTPATIENT
Start: 2023-01-01 | End: 2023-01-01 | Stop reason: HOSPADM

## 2023-01-01 RX ORDER — SODIUM CHLORIDE 0.9 % (FLUSH) 0.9 %
10 SYRINGE (ML) INJECTION EVERY 12 HOURS SCHEDULED
Status: DISCONTINUED | OUTPATIENT
Start: 2023-01-01 | End: 2023-01-01 | Stop reason: HOSPADM

## 2023-01-01 RX ORDER — MORPHINE SULFATE 20 MG/ML
5 SOLUTION ORAL
Status: CANCELLED | OUTPATIENT
Start: 2023-01-01 | End: 2023-01-01

## 2023-01-01 RX ORDER — LORAZEPAM 2 MG/ML
2 CONCENTRATE ORAL
Status: DISCONTINUED | OUTPATIENT
Start: 2023-01-01 | End: 2023-01-01 | Stop reason: HOSPADM

## 2023-01-01 RX ORDER — GLYCOPYRROLATE 0.2 MG/ML
0.2 INJECTION INTRAMUSCULAR; INTRAVENOUS
Status: DISCONTINUED | OUTPATIENT
Start: 2023-01-01 | End: 2023-01-01 | Stop reason: HOSPADM

## 2023-01-01 RX ORDER — SODIUM CHLORIDE 9 MG/ML
40 INJECTION, SOLUTION INTRAVENOUS AS NEEDED
Status: DISCONTINUED | OUTPATIENT
Start: 2023-01-01 | End: 2023-01-01 | Stop reason: HOSPADM

## 2023-01-01 RX ORDER — TORSEMIDE 10 MG/1
10 TABLET ORAL DAILY
Status: DISCONTINUED | OUTPATIENT
Start: 2023-01-01 | End: 2023-01-01

## 2023-01-01 RX ORDER — HYDROMORPHONE HYDROCHLORIDE 1 MG/ML
0.5 INJECTION, SOLUTION INTRAMUSCULAR; INTRAVENOUS; SUBCUTANEOUS
Status: CANCELLED | OUTPATIENT
Start: 2023-01-01 | End: 2023-01-01

## 2023-01-01 RX ORDER — MORPHINE SULFATE 10 MG/ML
6 INJECTION INTRAMUSCULAR; INTRAVENOUS; SUBCUTANEOUS
Status: DISCONTINUED | OUTPATIENT
Start: 2023-01-01 | End: 2023-01-01 | Stop reason: HOSPADM

## 2023-01-01 RX ORDER — POTASSIUM CHLORIDE 7.45 MG/ML
10 INJECTION INTRAVENOUS
Status: DISCONTINUED | OUTPATIENT
Start: 2023-01-01 | End: 2023-01-01

## 2023-01-01 RX ORDER — PROMETHAZINE HYDROCHLORIDE 6.25 MG/5ML
12.5 SYRUP ORAL EVERY 4 HOURS PRN
Status: DISCONTINUED | OUTPATIENT
Start: 2023-01-01 | End: 2023-01-01 | Stop reason: HOSPADM

## 2023-01-01 RX ORDER — LORAZEPAM 2 MG/ML
1 INJECTION INTRAMUSCULAR
Status: CANCELLED | OUTPATIENT
Start: 2023-01-01 | End: 2023-02-25

## 2023-01-01 RX ORDER — LORAZEPAM 2 MG/ML
0.5 CONCENTRATE ORAL
Status: DISCONTINUED | OUTPATIENT
Start: 2023-01-01 | End: 2023-01-01 | Stop reason: HOSPADM

## 2023-01-01 RX ORDER — LORAZEPAM 2 MG/ML
2 INJECTION INTRAMUSCULAR
Status: DISCONTINUED | OUTPATIENT
Start: 2023-01-01 | End: 2023-01-01 | Stop reason: HOSPADM

## 2023-01-01 RX ORDER — LORAZEPAM 2 MG/ML
1 CONCENTRATE ORAL
Status: DISCONTINUED | OUTPATIENT
Start: 2023-01-01 | End: 2023-01-01 | Stop reason: HOSPADM

## 2023-01-01 RX ORDER — KETOROLAC TROMETHAMINE 15 MG/ML
15 INJECTION, SOLUTION INTRAMUSCULAR; INTRAVENOUS EVERY 6 HOURS PRN
Status: CANCELLED | OUTPATIENT
Start: 2023-01-01 | End: 2023-01-01

## 2023-01-01 RX ORDER — DIPHENOXYLATE HYDROCHLORIDE AND ATROPINE SULFATE 2.5; .025 MG/1; MG/1
1 TABLET ORAL
Status: DISCONTINUED | OUTPATIENT
Start: 2023-01-01 | End: 2023-01-01 | Stop reason: HOSPADM

## 2023-01-01 RX ORDER — GLYCOPYRROLATE 0.2 MG/ML
0.4 INJECTION INTRAMUSCULAR; INTRAVENOUS
Status: CANCELLED | OUTPATIENT
Start: 2023-01-01

## 2023-01-01 RX ORDER — PROMETHAZINE HYDROCHLORIDE 25 MG/1
12.5 TABLET ORAL EVERY 4 HOURS PRN
Status: DISCONTINUED | OUTPATIENT
Start: 2023-01-01 | End: 2023-01-01 | Stop reason: HOSPADM

## 2023-01-01 RX ORDER — ATROPINE SULFATE 10 MG/ML
2 SOLUTION/ DROPS OPHTHALMIC 2 TIMES DAILY PRN
Status: DISCONTINUED | OUTPATIENT
Start: 2023-01-01 | End: 2023-01-01 | Stop reason: HOSPADM

## 2023-01-01 RX ORDER — HYDROMORPHONE HYDROCHLORIDE 1 MG/ML
0.5 INJECTION, SOLUTION INTRAMUSCULAR; INTRAVENOUS; SUBCUTANEOUS
Status: ACTIVE | OUTPATIENT
Start: 2023-01-01 | End: 2023-01-01

## 2023-01-01 RX ORDER — OLANZAPINE 5 MG/1
5 TABLET ORAL
Status: CANCELLED | OUTPATIENT
Start: 2023-01-01

## 2023-01-01 RX ORDER — OLANZAPINE 5 MG/1
5 TABLET ORAL
Status: DISCONTINUED | OUTPATIENT
Start: 2023-01-01 | End: 2023-01-01 | Stop reason: HOSPADM

## 2023-01-01 RX ORDER — ACETAMINOPHEN 325 MG/1
650 TABLET ORAL EVERY 6 HOURS PRN
Status: CANCELLED | OUTPATIENT
Start: 2023-01-01

## 2023-01-01 RX ORDER — FUROSEMIDE 10 MG/ML
40 INJECTION INTRAMUSCULAR; INTRAVENOUS EVERY 12 HOURS
Status: COMPLETED | OUTPATIENT
Start: 2023-01-01 | End: 2023-01-01

## 2023-01-01 RX ORDER — MORPHINE SULFATE 4 MG/ML
4 INJECTION, SOLUTION INTRAMUSCULAR; INTRAVENOUS
Status: DISCONTINUED | OUTPATIENT
Start: 2023-01-01 | End: 2023-01-01 | Stop reason: HOSPADM

## 2023-01-01 RX ORDER — TORSEMIDE 20 MG/1
20 TABLET ORAL DAILY
Status: DISCONTINUED | OUTPATIENT
Start: 2023-01-01 | End: 2023-01-01

## 2023-01-01 RX ORDER — MORPHINE SULFATE 2 MG/ML
2 INJECTION, SOLUTION INTRAMUSCULAR; INTRAVENOUS
Status: DISCONTINUED | OUTPATIENT
Start: 2023-01-01 | End: 2023-01-01 | Stop reason: HOSPADM

## 2023-01-01 RX ORDER — HYDROMORPHONE HYDROCHLORIDE 1 MG/ML
0.5 INJECTION, SOLUTION INTRAMUSCULAR; INTRAVENOUS; SUBCUTANEOUS
Status: DISCONTINUED | OUTPATIENT
Start: 2023-01-01 | End: 2023-01-01 | Stop reason: HOSPADM

## 2023-01-01 RX ORDER — SODIUM CHLORIDE 0.9 % (FLUSH) 0.9 %
10 SYRINGE (ML) INJECTION AS NEEDED
Status: DISCONTINUED | OUTPATIENT
Start: 2023-01-01 | End: 2023-01-01 | Stop reason: HOSPADM

## 2023-01-01 RX ORDER — SODIUM CHLORIDE 9 MG/ML
40 INJECTION, SOLUTION INTRAVENOUS AS NEEDED
Status: CANCELLED | OUTPATIENT
Start: 2023-01-01

## 2023-01-01 RX ORDER — ACETAMINOPHEN 325 MG/1
650 TABLET ORAL EVERY 6 HOURS PRN
Status: DISCONTINUED | OUTPATIENT
Start: 2023-01-01 | End: 2023-01-01 | Stop reason: HOSPADM

## 2023-01-01 RX ORDER — MORPHINE SULFATE 4 MG/ML
4 INJECTION, SOLUTION INTRAMUSCULAR; INTRAVENOUS
Status: CANCELLED | OUTPATIENT
Start: 2023-01-01 | End: 2023-02-25

## 2023-01-01 RX ORDER — MORPHINE SULFATE 20 MG/ML
5 SOLUTION ORAL
Status: ACTIVE | OUTPATIENT
Start: 2023-01-01 | End: 2023-01-01

## 2023-01-01 RX ORDER — POTASSIUM CHLORIDE 1.5 G/1.77G
40 POWDER, FOR SOLUTION ORAL AS NEEDED
Status: DISCONTINUED | OUTPATIENT
Start: 2023-01-01 | End: 2023-01-01

## 2023-01-01 RX ORDER — GLYCOPYRROLATE 0.2 MG/ML
0.4 INJECTION INTRAMUSCULAR; INTRAVENOUS
Status: DISCONTINUED | OUTPATIENT
Start: 2023-01-01 | End: 2023-01-01 | Stop reason: HOSPADM

## 2023-01-01 RX ORDER — METOPROLOL TARTRATE 50 MG/1
100 TABLET, FILM COATED ORAL 2 TIMES DAILY
Status: DISCONTINUED | OUTPATIENT
Start: 2023-01-01 | End: 2023-01-01

## 2023-01-01 RX ORDER — POTASSIUM CHLORIDE 750 MG/1
40 TABLET, FILM COATED, EXTENDED RELEASE ORAL AS NEEDED
Status: DISCONTINUED | OUTPATIENT
Start: 2023-01-01 | End: 2023-01-01

## 2023-01-01 RX ORDER — LORAZEPAM 2 MG/ML
1 INJECTION INTRAMUSCULAR
Status: DISCONTINUED | OUTPATIENT
Start: 2023-01-01 | End: 2023-01-01 | Stop reason: HOSPADM

## 2023-01-01 RX ORDER — NITROGLYCERIN 0.4 MG/1
0.4 TABLET SUBLINGUAL
Status: DISCONTINUED | OUTPATIENT
Start: 2023-01-01 | End: 2023-01-01

## 2023-01-01 RX ORDER — PROMETHAZINE HYDROCHLORIDE 12.5 MG/1
12.5 SUPPOSITORY RECTAL EVERY 4 HOURS PRN
Status: DISCONTINUED | OUTPATIENT
Start: 2023-01-01 | End: 2023-01-01 | Stop reason: HOSPADM

## 2023-01-01 RX ORDER — ATROPINE SULFATE 10 MG/ML
2 SOLUTION/ DROPS OPHTHALMIC 2 TIMES DAILY PRN
Status: CANCELLED | OUTPATIENT
Start: 2023-01-01

## 2023-01-01 RX ORDER — KETOROLAC TROMETHAMINE 15 MG/ML
15 INJECTION, SOLUTION INTRAMUSCULAR; INTRAVENOUS EVERY 6 HOURS PRN
Status: DISCONTINUED | OUTPATIENT
Start: 2023-01-01 | End: 2023-01-01 | Stop reason: HOSPADM

## 2023-01-01 RX ORDER — KETOROLAC TROMETHAMINE 15 MG/ML
15 INJECTION, SOLUTION INTRAMUSCULAR; INTRAVENOUS EVERY 6 HOURS PRN
Status: ACTIVE | OUTPATIENT
Start: 2023-01-01 | End: 2023-01-01

## 2023-01-01 RX ORDER — GLYCOPYRROLATE 0.2 MG/ML
0.2 INJECTION INTRAMUSCULAR; INTRAVENOUS
Status: CANCELLED | OUTPATIENT
Start: 2023-01-01

## 2023-01-01 RX ORDER — LORAZEPAM 2 MG/ML
0.5 CONCENTRATE ORAL
Status: CANCELLED | OUTPATIENT
Start: 2023-01-01 | End: 2023-02-25

## 2023-01-01 RX ORDER — LORAZEPAM 2 MG/ML
0.5 INJECTION INTRAMUSCULAR
Status: CANCELLED | OUTPATIENT
Start: 2023-01-01 | End: 2023-02-25

## 2023-01-01 RX ORDER — MORPHINE SULFATE 2 MG/ML
2 INJECTION, SOLUTION INTRAMUSCULAR; INTRAVENOUS
Status: CANCELLED | OUTPATIENT
Start: 2023-01-01 | End: 2023-01-01

## 2023-01-01 RX ORDER — SODIUM CHLORIDE 0.9 % (FLUSH) 0.9 %
10 SYRINGE (ML) INJECTION AS NEEDED
Status: CANCELLED | OUTPATIENT
Start: 2023-01-01

## 2023-01-01 RX ORDER — SCOLOPAMINE TRANSDERMAL SYSTEM 1 MG/1
1 PATCH, EXTENDED RELEASE TRANSDERMAL
Status: CANCELLED | OUTPATIENT
Start: 2023-01-01

## 2023-01-01 RX ORDER — PROMETHAZINE HYDROCHLORIDE 25 MG/1
12.5 TABLET ORAL EVERY 4 HOURS PRN
Status: CANCELLED | OUTPATIENT
Start: 2023-01-01

## 2023-01-01 RX ORDER — PROMETHAZINE HYDROCHLORIDE 12.5 MG/1
12.5 SUPPOSITORY RECTAL EVERY 4 HOURS PRN
Status: CANCELLED | OUTPATIENT
Start: 2023-01-01

## 2023-01-01 RX ORDER — ATORVASTATIN CALCIUM 20 MG/1
20 TABLET, FILM COATED ORAL DAILY
Status: DISCONTINUED | OUTPATIENT
Start: 2023-01-01 | End: 2023-01-01

## 2023-01-01 RX ORDER — DIPHENOXYLATE HYDROCHLORIDE AND ATROPINE SULFATE 2.5; .025 MG/1; MG/1
1 TABLET ORAL
Status: CANCELLED | OUTPATIENT
Start: 2023-01-01 | End: 2023-02-25

## 2023-01-01 RX ORDER — MORPHINE SULFATE 10 MG/ML
6 INJECTION INTRAMUSCULAR; INTRAVENOUS; SUBCUTANEOUS
Status: CANCELLED | OUTPATIENT
Start: 2023-01-01 | End: 2023-02-25

## 2023-01-01 RX ORDER — SODIUM CHLORIDE 0.9 % (FLUSH) 0.9 %
10 SYRINGE (ML) INJECTION EVERY 12 HOURS SCHEDULED
Status: CANCELLED | OUTPATIENT
Start: 2023-01-01

## 2023-01-01 RX ORDER — LORAZEPAM 2 MG/ML
1 CONCENTRATE ORAL
Status: CANCELLED | OUTPATIENT
Start: 2023-01-01 | End: 2023-02-25

## 2023-01-01 RX ORDER — LORAZEPAM 2 MG/ML
2 CONCENTRATE ORAL
Status: CANCELLED | OUTPATIENT
Start: 2023-01-01 | End: 2023-02-25

## 2023-01-01 RX ORDER — FUROSEMIDE 10 MG/ML
80 INJECTION INTRAMUSCULAR; INTRAVENOUS ONCE
Status: COMPLETED | OUTPATIENT
Start: 2023-01-01 | End: 2023-01-01

## 2023-01-01 RX ORDER — PROMETHAZINE HYDROCHLORIDE 6.25 MG/5ML
12.5 SYRUP ORAL EVERY 4 HOURS PRN
Status: CANCELLED | OUTPATIENT
Start: 2023-01-01

## 2023-01-01 RX ORDER — SODIUM CHLORIDE 0.9 % (FLUSH) 0.9 %
10 SYRINGE (ML) INJECTION AS NEEDED
Status: DISCONTINUED | OUTPATIENT
Start: 2023-01-01 | End: 2023-01-01

## 2023-01-01 RX ADMIN — FUROSEMIDE 40 MG: 10 INJECTION, SOLUTION INTRAMUSCULAR; INTRAVENOUS at 23:14

## 2023-01-01 RX ADMIN — OLANZAPINE 5 MG: 5 TABLET ORAL at 18:05

## 2023-01-01 RX ADMIN — GLYCOPYRROLATE 0.2 MG: 0.2 INJECTION INTRAMUSCULAR; INTRAVENOUS at 08:48

## 2023-01-01 RX ADMIN — METOPROLOL TARTRATE 2.5 MG: 1 INJECTION, SOLUTION INTRAVENOUS at 08:46

## 2023-01-01 RX ADMIN — METOPROLOL TARTRATE 100 MG: 25 TABLET, FILM COATED ORAL at 19:55

## 2023-01-01 RX ADMIN — MORPHINE SULFATE 2 MG: 2 INJECTION, SOLUTION INTRAMUSCULAR; INTRAVENOUS at 23:49

## 2023-01-01 RX ADMIN — ATORVASTATIN CALCIUM 20 MG: 20 TABLET, FILM COATED ORAL at 16:37

## 2023-01-01 RX ADMIN — LORAZEPAM 0.5 MG: 2 INJECTION INTRAMUSCULAR; INTRAVENOUS at 12:15

## 2023-01-01 RX ADMIN — BARIUM SULFATE 50 ML: 400 SUSPENSION ORAL at 10:15

## 2023-01-01 RX ADMIN — TORSEMIDE 10 MG: 20 TABLET ORAL at 08:30

## 2023-01-01 RX ADMIN — CEFTRIAXONE SODIUM 1 G: 1 INJECTION, POWDER, FOR SOLUTION INTRAMUSCULAR; INTRAVENOUS at 22:02

## 2023-01-01 RX ADMIN — POTASSIUM CHLORIDE 40 MEQ: 1.5 POWDER, FOR SOLUTION ORAL at 21:41

## 2023-01-01 RX ADMIN — SODIUM CHLORIDE 500 ML: 9 INJECTION, SOLUTION INTRAVENOUS at 22:42

## 2023-01-01 RX ADMIN — METOPROLOL TARTRATE 2.5 MG: 1 INJECTION, SOLUTION INTRAVENOUS at 14:42

## 2023-01-01 RX ADMIN — LORAZEPAM 0.5 MG: 2 INJECTION INTRAMUSCULAR; INTRAVENOUS at 04:32

## 2023-01-01 RX ADMIN — MORPHINE SULFATE 2 MG: 2 INJECTION, SOLUTION INTRAMUSCULAR; INTRAVENOUS at 22:48

## 2023-01-01 RX ADMIN — TORSEMIDE 20 MG: 20 TABLET ORAL at 17:18

## 2023-01-01 RX ADMIN — MORPHINE SULFATE 2 MG: 2 INJECTION, SOLUTION INTRAMUSCULAR; INTRAVENOUS at 13:34

## 2023-01-01 RX ADMIN — GLYCOPYRROLATE 0.2 MG: 0.2 INJECTION INTRAMUSCULAR; INTRAVENOUS at 00:41

## 2023-01-01 RX ADMIN — Medication 10 ML: at 22:10

## 2023-01-01 RX ADMIN — LORAZEPAM 0.5 MG: 2 INJECTION INTRAMUSCULAR; INTRAVENOUS at 12:31

## 2023-01-01 RX ADMIN — METOPROLOL TARTRATE 100 MG: 25 TABLET, FILM COATED ORAL at 21:02

## 2023-01-01 RX ADMIN — LORAZEPAM 0.5 MG: 2 INJECTION INTRAMUSCULAR; INTRAVENOUS at 23:49

## 2023-01-01 RX ADMIN — MORPHINE SULFATE 4 MG: 4 INJECTION, SOLUTION INTRAMUSCULAR; INTRAVENOUS at 20:18

## 2023-01-01 RX ADMIN — LORAZEPAM 0.5 MG: 2 INJECTION INTRAMUSCULAR; INTRAVENOUS at 13:34

## 2023-01-01 RX ADMIN — MORPHINE SULFATE 2 MG: 2 INJECTION, SOLUTION INTRAMUSCULAR; INTRAVENOUS at 08:48

## 2023-01-01 RX ADMIN — Medication 10 ML: at 08:49

## 2023-01-01 RX ADMIN — GLYCOPYRROLATE 0.2 MG: 0.2 INJECTION INTRAMUSCULAR; INTRAVENOUS at 16:37

## 2023-01-01 RX ADMIN — Medication 10 ML: at 20:00

## 2023-01-01 RX ADMIN — LORAZEPAM 1 MG: 2 INJECTION INTRAMUSCULAR; INTRAVENOUS at 04:03

## 2023-01-01 RX ADMIN — Medication 10 ML: at 08:50

## 2023-01-01 RX ADMIN — Medication 10 ML: at 21:45

## 2023-01-01 RX ADMIN — METOPROLOL TARTRATE 2.5 MG: 1 INJECTION, SOLUTION INTRAVENOUS at 20:00

## 2023-01-01 RX ADMIN — LORAZEPAM 0.5 MG: 2 INJECTION INTRAMUSCULAR; INTRAVENOUS at 00:41

## 2023-01-01 RX ADMIN — CEFTRIAXONE SODIUM 1 G: 1 INJECTION, POWDER, FOR SOLUTION INTRAMUSCULAR; INTRAVENOUS at 21:35

## 2023-01-01 RX ADMIN — METOPROLOL TARTRATE 100 MG: 25 TABLET, FILM COATED ORAL at 09:15

## 2023-01-01 RX ADMIN — CEFTRIAXONE SODIUM 1 G: 1 INJECTION, POWDER, FOR SOLUTION INTRAMUSCULAR; INTRAVENOUS at 21:30

## 2023-01-01 RX ADMIN — LORAZEPAM 0.5 MG: 2 INJECTION INTRAMUSCULAR; INTRAVENOUS at 18:46

## 2023-01-01 RX ADMIN — Medication 10 ML: at 03:01

## 2023-01-01 RX ADMIN — GLYCOPYRROLATE 0.2 MG: 0.2 INJECTION INTRAMUSCULAR; INTRAVENOUS at 04:03

## 2023-01-01 RX ADMIN — ATORVASTATIN CALCIUM 20 MG: 20 TABLET, FILM COATED ORAL at 08:47

## 2023-01-01 RX ADMIN — METOPROLOL TARTRATE 100 MG: 25 TABLET, FILM COATED ORAL at 08:41

## 2023-01-01 RX ADMIN — TORSEMIDE 10 MG: 20 TABLET ORAL at 14:00

## 2023-01-01 RX ADMIN — LORAZEPAM 0.5 MG: 2 INJECTION INTRAMUSCULAR; INTRAVENOUS at 08:48

## 2023-01-01 RX ADMIN — BARIUM SULFATE 1 TEASPOON(S): 0.6 CREAM ORAL at 10:15

## 2023-01-01 RX ADMIN — LORAZEPAM 1 MG: 2 INJECTION INTRAMUSCULAR; INTRAVENOUS at 09:14

## 2023-01-01 RX ADMIN — MORPHINE SULFATE 2 MG: 2 INJECTION, SOLUTION INTRAMUSCULAR; INTRAVENOUS at 18:46

## 2023-01-01 RX ADMIN — MORPHINE SULFATE 4 MG: 4 INJECTION, SOLUTION INTRAMUSCULAR; INTRAVENOUS at 09:14

## 2023-01-01 RX ADMIN — ATORVASTATIN CALCIUM 20 MG: 20 TABLET, FILM COATED ORAL at 09:15

## 2023-01-01 RX ADMIN — LORAZEPAM 0.5 MG: 2 INJECTION INTRAMUSCULAR; INTRAVENOUS at 22:15

## 2023-01-01 RX ADMIN — GLYCOPYRROLATE 0.2 MG: 0.2 INJECTION INTRAMUSCULAR; INTRAVENOUS at 21:06

## 2023-01-01 RX ADMIN — LORAZEPAM 0.5 MG: 2 INJECTION INTRAMUSCULAR; INTRAVENOUS at 21:06

## 2023-01-01 RX ADMIN — LORAZEPAM 0.5 MG: 2 INJECTION INTRAMUSCULAR; INTRAVENOUS at 22:48

## 2023-01-01 RX ADMIN — METOPROLOL TARTRATE 2.5 MG: 1 INJECTION, SOLUTION INTRAVENOUS at 15:01

## 2023-01-01 RX ADMIN — MORPHINE SULFATE 4 MG: 4 INJECTION, SOLUTION INTRAMUSCULAR; INTRAVENOUS at 00:41

## 2023-01-01 RX ADMIN — LORAZEPAM 0.5 MG: 2 INJECTION INTRAMUSCULAR; INTRAVENOUS at 02:53

## 2023-01-01 RX ADMIN — POTASSIUM CHLORIDE 40 MEQ: 1.5 POWDER, FOR SOLUTION ORAL at 19:31

## 2023-01-01 RX ADMIN — LORAZEPAM 0.5 MG: 2 INJECTION INTRAMUSCULAR; INTRAVENOUS at 16:37

## 2023-01-01 RX ADMIN — ATORVASTATIN CALCIUM 20 MG: 20 TABLET, FILM COATED ORAL at 09:36

## 2023-01-01 RX ADMIN — GLYCOPYRROLATE 0.2 MG: 0.2 INJECTION INTRAMUSCULAR; INTRAVENOUS at 20:18

## 2023-01-01 RX ADMIN — OLANZAPINE 5 MG: 5 TABLET ORAL at 19:00

## 2023-01-01 RX ADMIN — OLANZAPINE 5 MG: 5 TABLET ORAL at 18:40

## 2023-01-01 RX ADMIN — Medication 1 APPLICATION: at 17:18

## 2023-01-01 RX ADMIN — LORAZEPAM 0.5 MG: 2 INJECTION INTRAMUSCULAR; INTRAVENOUS at 20:19

## 2023-01-01 RX ADMIN — MORPHINE SULFATE 2 MG: 2 INJECTION, SOLUTION INTRAMUSCULAR; INTRAVENOUS at 22:16

## 2023-01-01 RX ADMIN — Medication 10 ML: at 21:41

## 2023-01-01 RX ADMIN — POTASSIUM CHLORIDE 40 MEQ: 1.5 POWDER, FOR SOLUTION ORAL at 23:42

## 2023-01-01 RX ADMIN — LORAZEPAM 0.5 MG: 2 INJECTION INTRAMUSCULAR; INTRAVENOUS at 09:47

## 2023-01-01 RX ADMIN — FUROSEMIDE 40 MG: 10 INJECTION, SOLUTION INTRAMUSCULAR; INTRAVENOUS at 15:01

## 2023-01-01 RX ADMIN — MORPHINE SULFATE 2 MG: 2 INJECTION, SOLUTION INTRAMUSCULAR; INTRAVENOUS at 04:32

## 2023-01-01 RX ADMIN — POTASSIUM CHLORIDE 40 MEQ: 1.5 POWDER, FOR SOLUTION ORAL at 18:23

## 2023-01-01 RX ADMIN — TORSEMIDE 10 MG: 20 TABLET ORAL at 08:41

## 2023-01-01 RX ADMIN — MORPHINE SULFATE 2 MG: 2 INJECTION, SOLUTION INTRAMUSCULAR; INTRAVENOUS at 02:54

## 2023-01-01 RX ADMIN — METOPROLOL TARTRATE 100 MG: 25 TABLET, FILM COATED ORAL at 22:33

## 2023-01-01 RX ADMIN — Medication 10 ML: at 09:37

## 2023-01-01 RX ADMIN — METOPROLOL TARTRATE 100 MG: 25 TABLET, FILM COATED ORAL at 08:47

## 2023-01-01 RX ADMIN — MORPHINE SULFATE 2 MG: 2 INJECTION, SOLUTION INTRAMUSCULAR; INTRAVENOUS at 21:06

## 2023-01-01 RX ADMIN — METOPROLOL TARTRATE 2.5 MG: 1 INJECTION, SOLUTION INTRAVENOUS at 02:12

## 2023-01-01 RX ADMIN — ATORVASTATIN CALCIUM 20 MG: 20 TABLET, FILM COATED ORAL at 08:30

## 2023-01-01 RX ADMIN — Medication 10 ML: at 08:28

## 2023-01-01 RX ADMIN — MORPHINE SULFATE 2 MG: 2 INJECTION, SOLUTION INTRAMUSCULAR; INTRAVENOUS at 12:15

## 2023-01-01 RX ADMIN — METOPROLOL TARTRATE 100 MG: 25 TABLET, FILM COATED ORAL at 22:02

## 2023-01-01 RX ADMIN — TORSEMIDE 10 MG: 20 TABLET ORAL at 08:47

## 2023-01-01 RX ADMIN — MORPHINE SULFATE 2 MG: 2 INJECTION, SOLUTION INTRAMUSCULAR; INTRAVENOUS at 09:47

## 2023-01-01 RX ADMIN — BARIUM SULFATE 55 ML: 0.81 POWDER, FOR SUSPENSION ORAL at 10:15

## 2023-01-01 RX ADMIN — METOPROLOL TARTRATE 100 MG: 25 TABLET, FILM COATED ORAL at 08:30

## 2023-01-01 RX ADMIN — MORPHINE SULFATE 4 MG: 4 INJECTION, SOLUTION INTRAMUSCULAR; INTRAVENOUS at 12:31

## 2023-01-01 RX ADMIN — CEFTRIAXONE SODIUM 1 G: 1 INJECTION, POWDER, FOR SOLUTION INTRAMUSCULAR; INTRAVENOUS at 22:10

## 2023-01-01 RX ADMIN — Medication 10 ML: at 09:15

## 2023-01-01 RX ADMIN — METOPROLOL TARTRATE 100 MG: 25 TABLET, FILM COATED ORAL at 22:10

## 2023-01-01 RX ADMIN — FUROSEMIDE 80 MG: 10 INJECTION, SOLUTION INTRAMUSCULAR; INTRAVENOUS at 22:01

## 2023-01-01 RX ADMIN — MORPHINE SULFATE 4 MG: 4 INJECTION, SOLUTION INTRAMUSCULAR; INTRAVENOUS at 04:03

## 2023-01-01 RX ADMIN — Medication 10 ML: at 08:22

## 2023-01-01 RX ADMIN — MORPHINE SULFATE 4 MG: 4 INJECTION, SOLUTION INTRAMUSCULAR; INTRAVENOUS at 16:37

## 2023-01-01 RX ADMIN — Medication 10 ML: at 22:03

## 2023-01-01 RX ADMIN — CEFTRIAXONE SODIUM 1 G: 1 INJECTION, POWDER, FOR SOLUTION INTRAMUSCULAR; INTRAVENOUS at 22:05

## 2023-01-01 RX ADMIN — MORPHINE SULFATE 4 MG: 4 INJECTION, SOLUTION INTRAMUSCULAR; INTRAVENOUS at 16:30

## 2023-01-01 RX ADMIN — ATORVASTATIN CALCIUM 20 MG: 20 TABLET, FILM COATED ORAL at 08:40

## 2023-02-08 PROBLEM — S06.5XAA SUBDURAL HEMATOMA, POST-TRAUMATIC: Status: ACTIVE | Noted: 2023-01-01

## 2023-02-08 PROBLEM — I10 BENIGN ESSENTIAL HYPERTENSION: Status: ACTIVE | Noted: 2022-01-01

## 2023-02-08 PROBLEM — I50.9 ACUTE CONGESTIVE HEART FAILURE, UNSPECIFIED HEART FAILURE TYPE: Status: ACTIVE | Noted: 2023-01-01

## 2023-02-08 PROBLEM — E78.5 HYPERLIPIDEMIA: Status: ACTIVE | Noted: 2022-01-01

## 2023-02-08 NOTE — ED TRIAGE NOTES
Sent from [facility] with c/o SOA.  Family states pt has been aspirating his food.  Had outpt CXR today.    Pt wearing mask on arrival.

## 2023-02-09 PROBLEM — I50.33 ACUTE ON CHRONIC DIASTOLIC CHF (CONGESTIVE HEART FAILURE): Status: ACTIVE | Noted: 2023-01-01

## 2023-02-09 PROBLEM — J12.3 HUMAN METAPNEUMOVIRUS (HMPV) PNEUMONIA: Status: ACTIVE | Noted: 2023-01-01

## 2023-02-09 NOTE — H&P
Patient Name:  Denver Ames  YOB: 1925  MRN:  [id number]  Admit Date:  2/8/2023  Patient Care Team:  Scottie Santiago MD as PCP - General (Family Medicine)      Subjective   History Present Illness     Chief Complaint   Patient presents with   • Shortness of Breath       Mr. Ames is a 89 y.o. non-smoker with a history of HTN, HLD, CAD s/p CABG, chronic diastolic CHF, PAF (not on AC due to recurrent falls), skin cancer on scalp s/p resection and flap, and osteomyelitis of the right foot s/p toe amputation a couple weeks ago,that presents to [facility] with cough and shortness of breath for the past several days. He has additionally been having some confusion for the past couple weeks, especially at night-he is therefore unable to provide much history but his daughter and granddaughter are at bedside to help with this. He has been having significant coughing every time he tries to eat or drink. They state that he has been generally declining for the past several weeks.  He was evaluated at urgent care and found to have an abnormal chest xray and was therefore sent to the ER.     History of Present Illness  Review of Systems   Unable to perform ROS: Mental status change        Personal History     Past Medical History:   Diagnosis Date   • Atrial fibrillation (HCC)    • Cancer (HCC) 2021    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   • Chronic anticoagulation     FOR A- FIB   • Coronary artery disease    • History of CHF (congestive heart failure)    • History of COVID-19 01/2021    HEADACHE   • History of melanoma     SHOULDER   • Hyperlipidemia    • Hypertension    • MI (myocardial infarction) (HCC) 2011   • Renal insufficiency      Past Surgical History:   Procedure Laterality Date   • APPENDECTOMY  1934   • CARDIAC CATHETERIZATION  09/21/2011   • CATARACT EXTRACTION W/ INTRAOCULAR LENS  IMPLANT, BILATERAL     • CORONARY ARTERY BYPASS GRAFT  10/01/2011    3 VESSELS   • EXCISION MASS HEAD/NECK N/A  3/31/2022    Procedure: HEAD NECK DEBRIDEMENT, scalp flap closure of open wound, with full thickness skin graft to head.;  Surgeon: Shakir Romero MD;  Location: [facility];  Service: Plastics;  Laterality: N/A;     Family History   Problem Relation Age of Onset   • Malig Hyperthermia Neg Hx      Social History     Tobacco Use   • Smoking status: Never   • Smokeless tobacco: Never   Vaping Use   • Vaping Use: Never used   Substance Use Topics   • Alcohol use: Yes     Comment: OCCAS   • Drug use: Never     No current facility-administered medications on file prior to encounter.     Current Outpatient Medications on File Prior to Encounter   Medication Sig Dispense Refill   • aspirin 81 MG EC tablet Take 81 mg by mouth Daily.     • atorvastatin (LIPITOR) 20 MG tablet Take 20 mg by mouth Daily.     • Cyanocobalamin (VITAMIN B 12 PO) Take 1 tablet by mouth Every Morning.     • ferrous sulfate 325 (65 FE) MG EC tablet Take 325 mg by mouth Daily.     • metoprolol tartrate (LOPRESSOR) 100 MG tablet Take 100 mg by mouth 2 (Two) Times a Day.     • multivitamins-minerals (PRESERVISION AREDS 2) capsule capsule Take 1 capsule by mouth 2 (Two) Times a Day.     • Potassium Chloride Marisa ER (KLOR-CON M20 PO) Take 20 mEq by mouth Every Morning.     • torsemide (DEMADEX) 20 MG tablet Take 20 mg by mouth Daily.       No Known Allergies    Objective    Objective     Vital Signs  Temp:  [96.9 °F (36.1 °C)-98.6 °F (37 °C)] 97.1 °F (36.2 °C)  Heart Rate:  [0-124] 73  Resp:  [16-20] 16  BP: (106-126)/(61-85) 110/61  SpO2:  [92 %-100 %] 98 %  on  Flow (L/min):  [2-3.5] 2;   Device (Oxygen Therapy): nasal cannula  Body mass index is 18.76 kg/m².    Physical Exam  Vitals and nursing note reviewed.   Constitutional:       General: He is not in acute distress.     Appearance: He is ill-appearing (chronically). He is not toxic-appearing or diaphoretic.   HENT:      Head: Normocephalic.      Comments: Surgical wounds on scalp do not  appear to be infected     Nose: Nose normal.      Mouth/Throat:      Mouth: Mucous membranes are moist.      Pharynx: Oropharynx is clear.   Eyes:      Conjunctiva/sclera: Conjunctivae normal.      Pupils: Pupils are equal, round, and reactive to light.   Cardiovascular:      Rate and Rhythm: Normal rate and regular rhythm.      Pulses: Normal pulses.   Pulmonary:      Effort: Pulmonary effort is normal.      Breath sounds: Examination of the right-lower field reveals decreased breath sounds and rales. Examination of the left-lower field reveals decreased breath sounds. Decreased breath sounds and rales present.   Abdominal:      General: Bowel sounds are normal.      Palpations: Abdomen is soft.      Tenderness: There is no abdominal tenderness.   Musculoskeletal:         General: Swelling (trace dependent) present. No tenderness.      Cervical back: Normal range of motion and neck supple.   Skin:     General: Skin is warm and dry.      Capillary Refill: Capillary refill takes less than 2 seconds.   Neurological:      General: No focal deficit present.      Mental Status: He is lethargic.      Comments: Awakens to voice   Psychiatric:         Mood and Affect: Mood normal.         Behavior: Behavior normal.       Results Review:  I reviewed the patient's new clinical results.  I reviewed the patient's new imaging results and agree with the interpretation.  I reviewed the patient's other test results and agree with the interpretation  I personally viewed and interpreted the patient's EKG/Telemetry data    Lab Results (last 24 hours)     Procedure Component Value Units Date/Time    CBC & Differential [[id number]]  (Abnormal) Collected: 02/08/23 1819    Specimen: Blood Updated: 02/08/23 1833    Narrative:      The following orders were created for panel order CBC & Differential.  Procedure                               Abnormality         Status                     ---------                               -----------          ------                     CBC Auto Differential[[id number]]        Abnormal            Final result                 Please view results for these tests on the individual orders.    Comprehensive Metabolic Panel [[id number]]  (Abnormal) Collected: 02/08/23 1819    Specimen: Blood Updated: 02/08/23 1852     Glucose 142 mg/dL      BUN 20 mg/dL      Creatinine 1.34 mg/dL      Sodium 139 mmol/L      Potassium 4.4 mmol/L      Comment: Slight hemolysis detected by analyzer. Results may be affected.        Chloride 100 mmol/L      CO2 27.0 mmol/L      Calcium 9.2 mg/dL      Total Protein 7.2 g/dL      Albumin 4.0 g/dL      ALT (SGPT) 15 U/L      AST (SGOT) 24 U/L      Alkaline Phosphatase 170 U/L      Total Bilirubin 1.2 mg/dL      Globulin 3.2 gm/dL      A/G Ratio 1.3 g/dL      BUN/Creatinine Ratio 14.9     Anion Gap 12.0 mmol/L      eGFR 48.2 mL/min/1.73     Narrative:      GFR Normal >60  Chronic Kidney Disease <60  Kidney Failure <15    The GFR formula is only valid for adults with stable renal function between ages 18 and 70.    BNP [[id number]]  (Abnormal) Collected: 02/08/23 1819    Specimen: Blood Updated: 02/08/23 1850     proBNP 9,896.0 pg/mL     Narrative:      Among patients with dyspnea, NT-proBNP is highly sensitive for the detection of acute congestive heart failure. In addition NT-proBNP of <300 pg/ml effectively rules out acute congestive heart failure with 99% negative predictive value.    Results may be falsely decreased if patient taking Biotin.      Troponin [[id number]]  (Abnormal) Collected: 02/08/23 1819    Specimen: Blood Updated: 02/08/23 1902     HS Troponin T 42 ng/L     Narrative:      High Sensitive Troponin T Reference Range:  <10.0 ng/L- Negative Female for AMI  <15.0 ng/L- Negative Male for AMI  >=10 - Abnormal Female indicating possible myocardial injury.  >=15 - Abnormal Male indicating possible myocardial injury.   Clinicians would have to utilize clinical acumen, EKG, Troponin, and  serial changes to determine if it is an Acute Myocardial Infarction or myocardial injury due to an underlying chronic condition.         CBC Auto Differential [[id number]]  (Abnormal) Collected: 02/08/23 1819    Specimen: Blood Updated: 02/08/23 1833     WBC 15.41 10*3/mm3      RBC 4.92 10*6/mm3      Hemoglobin 14.3 g/dL      Hematocrit 45.4 %      MCV 92.3 fL      MCH 29.1 pg      MCHC 31.5 g/dL      RDW 14.0 %      RDW-SD 46.8 fl      MPV 10.5 fL      Platelets 224 10*3/mm3      Neutrophil % 79.3 %      Lymphocyte % 12.3 %      Monocyte % 6.7 %      Eosinophil % 0.6 %      Basophil % 0.4 %      Immature Grans % 0.7 %      Neutrophils, Absolute 12.21 10*3/mm3      Lymphocytes, Absolute 1.89 10*3/mm3      Monocytes, Absolute 1.04 10*3/mm3      Eosinophils, Absolute 0.10 10*3/mm3      Basophils, Absolute 0.06 10*3/mm3      Immature Grans, Absolute 0.11 10*3/mm3      nRBC 0.0 /100 WBC     High Sensitivity Troponin T 2Hr [[id number]]  (Abnormal) Collected: 02/08/23 2139    Specimen: Blood Updated: 02/08/23 2214     HS Troponin T 33 ng/L      Troponin T Delta -9 ng/L     Narrative:      High Sensitive Troponin T Reference Range:  <10.0 ng/L- Negative Female for AMI  <15.0 ng/L- Negative Male for AMI  >=10 - Abnormal Female indicating possible myocardial injury.  >=15 - Abnormal Male indicating possible myocardial injury.   Clinicians would have to utilize clinical acumen, EKG, Troponin, and serial changes to determine if it is an Acute Myocardial Infarction or myocardial injury due to an underlying chronic condition.         Blood Culture - Blood, Arm, Right [[id number]] Collected: 02/08/23 2139    Specimen: Blood from Arm, Right Updated: 02/08/23 2145    Blood Culture - Blood, Arm, Right [[id number]] Collected: 02/08/23 2139    Specimen: Blood from Arm, Right Updated: 02/08/23 2146    Lactic Acid, Plasma [[id number]]  (Abnormal) Collected: 02/08/23 2139    Specimen: Blood Updated: 02/08/23 2239     Lactate 3.9 mmol/L      Urinalysis With Microscopic If Indicated (No Culture) - Straight Cath [[id number]]  (Abnormal) Collected: 02/08/23 2152    Specimen: Urine from Straight Cath Updated: 02/08/23 2241     Color, UA Yellow     Appearance, UA Clear     pH, UA 5.5     Specific Gravity, UA 1.016     Glucose, UA Negative     Ketones, UA Trace     Bilirubin, UA Negative     Blood, UA Negative     Protein, UA Trace     Leuk Esterase, UA Negative     Nitrite, UA Negative     Urobilinogen, UA 0.2 E.U./dL    Narrative:      Urine microscopic not indicated.    Respiratory Panel PCR w/COVID-19(SARS-CoV-2) ALIZA/AGUSTO/[facility]/PAD/COR/MAD/LATRELL In-House, NP Swab in UTM/VTM, 3-4 HR TAT - Swab, Nasopharynx [[id number]]  (Abnormal) Collected: 02/08/23 2213    Specimen: Swab from Nasopharynx Updated: 02/08/23 2348     ADENOVIRUS, PCR Not Detected     Coronavirus 229E Not Detected     Coronavirus HKU1 Not Detected     Coronavirus NL63 Not Detected     Coronavirus OC43 Not Detected     COVID19 Not Detected     Human Metapneumovirus Detected     Human Rhinovirus/Enterovirus Not Detected     Influenza A PCR Not Detected     Influenza B PCR Not Detected     Parainfluenza Virus 1 Not Detected     Parainfluenza Virus 2 Not Detected     Parainfluenza Virus 3 Not Detected     Parainfluenza Virus 4 Not Detected     RSV, PCR Not Detected     Bordetella pertussis pcr Not Detected     Bordetella parapertussis PCR Not Detected     Chlamydophila pneumoniae PCR Not Detected     Mycoplasma pneumo by PCR Not Detected    Narrative:      In the setting of a positive respiratory panel with a viral infection PLUS a negative procalcitonin without other underlying concern for bacterial infection, consider observing off antibiotics or discontinuation of antibiotics and continue supportive care. If the respiratory panel is positive for atypical bacterial infection (Bordetella pertussis, Chlamydophila pneumoniae, or Mycoplasma pneumoniae), consider antibiotic de-escalation to target atypical  bacterial infection.    STAT Lactic Acid, Reflex [[id number]]  (Normal) Collected: 02/09/23 0122    Specimen: Blood Updated: 02/09/23 0213     Lactate 1.8 mmol/L     Basic Metabolic Panel [[id number]]  (Abnormal) Collected: 02/09/23 0904    Specimen: Blood Updated: 02/09/23 1042     Glucose 93 mg/dL      BUN 20 mg/dL      Creatinine 1.12 mg/dL      Sodium 143 mmol/L      Potassium 3.4 mmol/L      Chloride 103 mmol/L      CO2 31.0 mmol/L      Calcium 8.6 mg/dL      BUN/Creatinine Ratio 17.9     Anion Gap 9.0 mmol/L      eGFR 59.7 mL/min/1.73     Narrative:      GFR Normal >60  Chronic Kidney Disease <60  Kidney Failure <15    The GFR formula is only valid for adults with stable renal function between ages 18 and 70.    CBC Auto Differential [[id number]]  (Abnormal) Collected: 02/09/23 0904    Specimen: Blood Updated: 02/09/23 1110     WBC 9.30 10*3/mm3      RBC 4.11 10*6/mm3      Hemoglobin 11.4 g/dL      Hematocrit 37.2 %      MCV 90.5 fL      MCH 27.7 pg      MCHC 30.6 g/dL      RDW 14.3 %      RDW-SD 47.1 fl      MPV 10.4 fL      Platelets 170 10*3/mm3      Neutrophil % 73.9 %      Lymphocyte % 17.2 %      Monocyte % 7.4 %      Eosinophil % 0.5 %      Basophil % 0.4 %      Immature Grans % 0.6 %      Neutrophils, Absolute 6.86 10*3/mm3      Lymphocytes, Absolute 1.60 10*3/mm3      Monocytes, Absolute 0.69 10*3/mm3      Eosinophils, Absolute 0.05 10*3/mm3      Basophils, Absolute 0.04 10*3/mm3      Immature Grans, Absolute 0.06 10*3/mm3      nRBC 0.0 /100 WBC           Imaging Results (Last 24 Hours)     Procedure Component Value Units Date/Time    CT Chest Without Contrast Diagnostic [[id number]] Collected: 02/08/23 1935     Updated: 02/08/23 1945    Narrative:      CT CHEST WO CONTRAST DIAGNOSTIC-     INDICATIONS: Short of breath     TECHNIQUE: Radiation dose reduction techniques were utilized, including  automated exposure control and exposure modulation based on body size.  Unenhanced CHEST CT     COMPARISON: None  available      FINDINGS:           The heart size is enlarged without pericardial effusion. Coronary  arterial calcifications are prominent. A few subcentimeter short axis  mediastinal lymph nodes are seen. Assessment of vascular and mediastinal  structures is limited without intravenous contrast material.        The airways appear clear.     Mild to moderate right pleural effusion is present. Pleural plaques are  apparent, some with calcifications. Subpleural lesions appear somewhat  nodular, for example a 2.7 cm pleural based nodular density at the level  of the right middle lobe could be a pleural plaque, metastatic disease,  or pulmonary nodule, PET CT correlation could be obtained as indicated,  interval follow-up is also advised to characterize change.     The lungs show groundglass infiltrates in both lungs, especially in the  right lower lobe and right middle lobe, also left lower lobe that have  represent edema and/or atypical pneumonia.     Upper abdominal structures show no acute findings. Gallstones are  present in the gallbladder. Splenic arterial calcifications are  conspicuous. Pancreas is thinned. Duodenal diverticulum is apparent..     Degenerative changes are seen in the spine. No acute fracture is  identified.       Impression:         Mild to moderate right pleural effusion with bilateral pulmonary  infiltrates, follow-up recommended.     Pleural plaques versus nodules, further evaluation/follow-up recommended  as indicated.     This report was finalized on 2/8/2023 7:42 PM by Dr. Luis Fernando Sage M.D.                 ECG 12 Lead ED Triage Standing Order; SOA   Final Result   HEART RATE= 105  bpm   RR Interval= 571  ms   WY Interval= 46  ms   P Horizontal Axis=   deg   P Front Axis= 0  deg   QRSD Interval= 95  ms   QT Interval= 361  ms   QRS Axis= -3  deg   T Wave Axis= 162  deg   - ABNORMAL ECG -   Atrial fibrillation with rapid V-rate   LVH with secondary repolarization abnormality    Borderline prolonged QT interval   Since prior tracing hr has increased   Electronically Signed By: Alex Rueda ([facility]) 08-Feb-2023 20:33:00   Date and Time of Study: 2023-02-08 17:55:52           Assessment/Plan     Active Hospital Problems    Diagnosis  POA   • **Acute on chronic diastolic CHF (congestive heart failure) (HCC) [I50.33]  Yes   • Human metapneumovirus (hMPV) pneumonia [J12.3]  Yes   • Paroxysmal atrial fibrillation (HCC) [I48.0]  Yes   • Hypertension [I10]  Yes   • Hyperlipidemia [E78.5]  Yes      Resolved Hospital Problems   No resolved problems to display.   Human Metapneumovirus Pneumonia  - will provide supportive care  - he does have some evidence of a superimposed bacterial pneumonia and we will treat with ceftriaxone, send culture and follow procalcitonin  - pulmonary toilet as able    Delirium  - I do suspect some underlying dementia though this is not diagnosed  - probably due to acute illness, will observe delirium precautions  - check head CT scan    Acute on chronic diastolic CHF  - will give a couple doses of IV lasix and monitor ins/outs, daily weights, and chemistries    PAF  - rate controlled, not on AC due to falls  - unable to take PO currently, will start IV metoprolol with holding parameters    Oropharyngeal Dysphagia  - he has been having some issues with this since his CABG over 10 years ago  - probably worsened due to above issues vs chronic progression  - SLP following, NPO for now  - I did discuss artificial nutrition with the patient's family and they do not want this which is certainly reasonable    I discussed the patient's findings and my recommendations with patient, family and nursing staff.    VTE Prophylaxis - SCDs.  Code Status - DNR.     Overall guarded prognosis given his age and co-morbidities. I did discuss with the patient's daughter and granddaughter at bedside and they seem to have a good understanding of his condition and prognosis. They say that they  have talked about this before and that he has had a good life and would not want his life artificially prolonged at the expense of quality of life. They confirm he is DNR. They do not want escalation of care and if he does not have improvement with the above measures palliative care will be considered. I discussed a palliative care team consult and they are agreeable.    Doc Han MD  [facility]  02/09/23  13:58 EST

## 2023-02-09 NOTE — PLAN OF CARE
Goal Outcome Evaluation:  Plan of Care Reviewed With: patient, son        Progress: no change  Outcome Evaluation: New admit from ER, son at bedside, disoriented time/situation, wound consulted, speech consulted, IV Rocephin, purewick, bed alarm on, will continue to monitor

## 2023-02-09 NOTE — ED PROVIDER NOTES
EMERGENCY DEPARTMENT ENCOUNTER    Room Number:  09/09  Date seen:  2/8/2023  PCP: Scottie Santiago MD  Historian: Patient      HPI:  Chief Complaint: Cough and shortness of breath  A complete HPI/ROS/PMH/PSH/SH/FH are unobtainable due to: Nothing  Context: Denver Ames is a 89 y.o. male who presents to the ED c/o cough and shortness of breath.  Patient's family gives most history.  Patient has a history of atrial fibrillation congestive heart failure hypertension.  Has had increasing cough and congestion recently.  Family does state every time he eats or drinks he does cough.  Patient has had no chest pain.  No vomiting or diarrhea.  Has had some increasing agitation and hallucinations.  Patient went to an urgent care and they got an x-ray and told them the had aspiration.  Patient has had no dysuria.            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   • Controlled atrial fibrillation (HCC) 09/17/2015   • Subdural hematoma, post-traumatic 01/26/2023   • Cardiomyopathy (HCC) 11/22/2013   • Benign essential hypertension 02/21/2022   • Presence of aortocoronary bypass graft 11/02/2012   • Hyperlipidemia 02/21/2022     Resolved Ambulatory Problems     Diagnosis Date Noted   • No Resolved Ambulatory Problems     Past Medical History:   Diagnosis Date   • Cancer (HCC) 2021   • History of COVID-19 01/2021   • History of melanoma    • MI (myocardial infarction) ([facility]) 2011         PAST SURGICAL HISTORY  Past Surgical History:   Procedure Laterality Date    • APPENDECTOMY  1934   • CARDIAC CATHETERIZATION  09/21/2011   • CATARACT EXTRACTION W/ INTRAOCULAR LENS  IMPLANT, BILATERAL     • CORONARY ARTERY BYPASS GRAFT  10/01/2011    3 VESSELS   • EXCISION MASS HEAD/NECK N/A 3/31/2022    Procedure: HEAD NECK DEBRIDEMENT, scalp flap closure of open wound, with full thickness skin graft to head.;  Surgeon: Shakir Romero MD;  Location: [facility];  Service: Plastics;  Laterality: N/A;         FAMILY HISTORY  Family History   Problem Relation Age of Onset   • Malig Hyperthermia Neg Hx          SOCIAL HISTORY  Social History     Socioeconomic History   • Marital status:    Tobacco Use   • Smoking status: Never   • Smokeless tobacco: Never   Vaping Use   • Vaping Use: Never used   Substance and Sexual Activity   • Alcohol use: Yes     Comment: OCCAS   • Drug use: Never   • Sexual activity: Defer         ALLERGIES  Patient has no known allergies.        REVIEW OF SYSTEMS  Review of Systems   Cough congestion      PHYSICAL EXAM  ED Triage Vitals   Temp Heart Rate Resp BP SpO2   02/08/23 1747 02/08/23 1750 02/08/23 1756 02/08/23 1750 02/08/23 1755   96.9 °F (36.1 °C) (!) 124 18 116/82 97 %      Temp src Heart Rate Source Patient Position BP Location FiO2 (%)   02/08/23 1747 -- -- -- --   Tympanic           Physical Exam      GENERAL: no acute distress  HENT: nares patent  EYES: no scleral icterus  CV: regular rhythm, normal rate  RESPIRATORY: normal effort  ABDOMEN: soft, nontender  MUSCULOSKELETAL: no deformity  NEURO: alert, moves all extremities, follows commands  PSYCH:  calm, cooperative  SKIN: warm, dry    Vital signs and nursing notes reviewed.    Patient was wearing a face mask when I entered the room and they continued to wear a mask throughout their stay in the ED.  I wore PPE, including  gloves, face mask with shield or face mask with goggles whenever I was in the room with patient.       LAB RESULTS  Recent Results (from the past 24 hour(s))   ECG  12 Lead ED Triage Standing Order; SOA    Collection Time: 02/08/23  5:55 PM   Result Value Ref Range    QT Interval 361 ms   Comprehensive Metabolic Panel    Collection Time: 02/08/23  6:19 PM    Specimen: Blood   Result Value Ref Range    Glucose 142 (H) 65 - 99 mg/dL    BUN 20 8 - 23 mg/dL    Creatinine 1.34 (H) 0.76 - 1.27 mg/dL    Sodium 139 136 - 145 mmol/L    Potassium 4.4 3.5 - 5.2 mmol/L    Chloride 100 98 - 107 mmol/L    CO2 27.0 22.0 - 29.0 mmol/L    Calcium 9.2 8.2 - 9.6 mg/dL    Total Protein 7.2 6.0 - 8.5 g/dL    Albumin 4.0 3.5 - 5.2 g/dL    ALT (SGPT) 15 1 - 41 U/L    AST (SGOT) 24 1 - 40 U/L    Alkaline Phosphatase 170 (H) 39 - 117 U/L    Total Bilirubin 1.2 0.0 - 1.2 mg/dL    Globulin 3.2 gm/dL    A/G Ratio 1.3 g/dL    BUN/Creatinine Ratio 14.9 7.0 - 25.0    Anion Gap 12.0 5.0 - 15.0 mmol/L    eGFR 48.2 (L) >60.0 mL/min/1.73   BNP    Collection Time: 02/08/23  6:19 PM    Specimen: Blood   Result Value Ref Range    proBNP 9,896.0 (H) 0.0 - 1,800.0 pg/mL   Troponin    Collection Time: 02/08/23  6:19 PM    Specimen: Blood   Result Value Ref Range    HS Troponin T 42 (H) <15 ng/L   Green Top (Gel)    Collection Time: 02/08/23  6:19 PM   Result Value Ref Range    Extra Tube Hold for add-ons.    Lavender Top    Collection Time: 02/08/23  6:19 PM   Result Value Ref Range    Extra Tube hold for add-on    Gold Top - SST    Collection Time: 02/08/23  6:19 PM   Result Value Ref Range    Extra Tube Hold for add-ons.    Light Blue Top    Collection Time: 02/08/23  6:19 PM   Result Value Ref Range    Extra Tube Hold for add-ons.    CBC Auto Differential    Collection Time: 02/08/23  6:19 PM    Specimen: Blood   Result Value Ref Range    WBC 15.41 (H) 3.40 - 10.80 10*3/mm3    RBC 4.92 4.14 - 5.80 10*6/mm3    Hemoglobin 14.3 13.0 - 17.7 g/dL    Hematocrit 45.4 37.5 - 51.0 %    MCV 92.3 79.0 - 97.0 fL    MCH 29.1 26.6 - 33.0 pg    MCHC 31.5 31.5 - 35.7 g/dL    RDW 14.0 12.3 - 15.4 %    RDW-SD 46.8 37.0 - 54.0 fl     MPV 10.5 6.0 - 12.0 fL    Platelets 224 140 - 450 10*3/mm3    Neutrophil % 79.3 (H) 42.7 - 76.0 %    Lymphocyte % 12.3 (L) 19.6 - 45.3 %    Monocyte % 6.7 5.0 - 12.0 %    Eosinophil % 0.6 0.3 - 6.2 %    Basophil % 0.4 0.0 - 1.5 %    Immature Grans % 0.7 (H) 0.0 - 0.5 %    Neutrophils, Absolute 12.21 (H) 1.70 - 7.00 10*3/mm3    Lymphocytes, Absolute 1.89 0.70 - 3.10 10*3/mm3    Monocytes, Absolute 1.04 (H) 0.10 - 0.90 10*3/mm3    Eosinophils, Absolute 0.10 0.00 - 0.40 10*3/mm3    Basophils, Absolute 0.06 0.00 - 0.20 10*3/mm3    Immature Grans, Absolute 0.11 (H) 0.00 - 0.05 10*3/mm3    nRBC 0.0 0.0 - 0.2 /100 WBC       Ordered the above labs and reviewed the results.        RADIOLOGY  XR Chest 2 View    Result Date: 2/8/2023  INDICATION:  Shortness of breath. TECHNIQUE:  Frontal and lateral chest. COMPARISON:  None available FINDINGS:   The cardiomediastinal silhouette is normal in size.  There is no consolidation or atelectasis in either lung. Blunting of the right costophrenic angle. There is no pneumothorax. Cortical disruption along the anterolateral margin of the right sixth rib. IMPRESSION: Suspect right side small hemothorax. No pneumothorax. Anterior right sixth rib fracture..   Signed by Pernell Koch MD    CT Chest Without Contrast Diagnostic    Result Date: 2/8/2023  CT CHEST WO CONTRAST DIAGNOSTIC-  INDICATIONS: Short of breath  TECHNIQUE: Radiation dose reduction techniques were utilized, including automated exposure control and exposure modulation based on body size. Unenhanced CHEST CT  COMPARISON: None available  FINDINGS:    The heart size is enlarged without pericardial effusion. Coronary arterial calcifications are prominent. A few subcentimeter short axis mediastinal lymph nodes are seen. Assessment of vascular and mediastinal structures is limited without intravenous contrast material.   The airways appear clear.  Mild to moderate right pleural effusion is present. Pleural plaques are  apparent, some with calcifications. Subpleural lesions appear somewhat nodular, for example a 2.7 cm pleural based nodular density at the level of the right middle lobe could be a pleural plaque, metastatic disease, or pulmonary nodule, PET CT correlation could be obtained as indicated, interval follow-up is also advised to characterize change.  The lungs show groundglass infiltrates in both lungs, especially in the right lower lobe and right middle lobe, also left lower lobe that have represent edema and/or atypical pneumonia.  Upper abdominal structures show no acute findings. Gallstones are present in the gallbladder. Splenic arterial calcifications are conspicuous. Pancreas is thinned. Duodenal diverticulum is apparent..  Degenerative changes are seen in the spine. No acute fracture is identified.       Mild to moderate right pleural effusion with bilateral pulmonary infiltrates, follow-up recommended.  Pleural plaques versus nodules, further evaluation/follow-up recommended as indicated.  This report was finalized on 2/8/2023 7:42 PM by Dr. Luis Fernando Sage M.D.        Ordered the above noted radiological studies.  CT independently interpreted by me and does seem to have bilateral infiltrates with effusions          PROCEDURES  Procedures        EKG          EKG time: 1755  Rhythm/Rate: Sinus tachycardia 105  P waves and NJ: Normal P waves  QRS, axis: LVH  ST and T waves: Nonspecific ST-T waves    Interpreted Contemporaneously by me, independently viewed  No prior      MEDICATIONS GIVEN IN ER  Medications   sodium chloride 0.9 % flush 10 mL (has no administration in time range)   cefTRIAXone (ROCEPHIN) 1 g in sodium chloride 0.9 % 100 mL IVPB-VTB (1 g Intravenous New Bag 2/8/23 1736)   sodium chloride 0.9 % flush 10 mL (has no administration in time range)   sodium chloride 0.9 % flush 10 mL (has no administration in time range)   sodium chloride 0.9 % infusion 40 mL (has no administration in time range)    nitroglycerin (NITROSTAT) SL tablet 0.4 mg (has no administration in time range)   cefTRIAXone (ROCEPHIN) 1 g in sodium chloride 0.9 % 100 mL IVPB-VTB (has no administration in time range)   furosemide (LASIX) injection 80 mg (80 mg Intravenous Given 2/8/23 2201)         Patient did not receive the recommended 30ml/kg fluid bolus for sepsis because it would be harmful or detrimental to the patient. The patient has Heart Failure.   The patient was ordered 500cc of fluids.          MEDICAL DECISION MAKING, PROGRESS, and CONSULTS    All labs have been independently reviewed by me.  All radiology studies have been reviewed by me and I have also reviewed the radiology report.   EKG's independently viewed and interpreted by me.  Discussion below represents my analysis of pertinent findings related to patient's condition, differential diagnosis, treatment plan and final disposition.      Additional sources:  - Discussed/ obtained information from independent historians: Patient's family present and I do state he has been more confused and agitated.  States every time he eats he does cough.    - External (non-ED) record review: Patient was seen in urgent care today and felt to have aspiration pneumonia.  Chest x-ray was done today and was interpreted as effusion with rib fracture    - Chronic or social conditions impacting care: None    - Shared decision making: None      Orders placed during this visit:  Orders Placed This Encounter   Procedures   • Blood Culture - Blood,   • Blood Culture - Blood,   • Respiratory Panel PCR w/COVID-19(SARS-CoV-2) ALIZA/AGUSTO/DALLAS/PAD/COR/MAD/LATRELL In-House, NP Swab in UTM/VTM, 3-4 HR TAT - Swab, Nasopharynx   • CT Chest Without Contrast Diagnostic   • [place] Draw   • Comprehensive Metabolic Panel   • BNP   • Troponin   • CBC Auto Differential   • High Sensitivity Troponin T 2Hr   • Urinalysis With Microscopic If Indicated (No Culture) - Urine, Clean Catch   • Lactic Acid, Plasma   • Blood Gas,  Arterial -   • Potassium   • Magnesium   • Blood Gas, Arterial -   • Basic Metabolic Panel   • CBC Auto Differential   • NPO Diet NPO Type: Strict NPO   • Undress & Gown   • Vital Signs   • Vital Signs   • Intake & Output   • Weigh Patient   • Nursing Dysphagia Screening (Complete Prior to Giving Anything By Mouth)   • RN to Place Order SLP Consult - Eval & Treat Choosing Reason of RN Dysphagia Screen Failed   • Nurse to Call MD or Nutrition Services for Diet if Patient Passes Dysphagia Screen   • Place Sequential Compression Device   • Maintain Sequential Compression Device   • Telemetry - Maintain IV Access   • May Be Off Telemetry for Tests   • Cardiac Monitoring   • Pulse Oximetry, Continuous   • Up With Assistance   • Strict Intake & Output   • Daily Weights   • LHA (on-call MD unless specified) Details   • Pulse Oximetry on Admit   • Incentive Spirometry   • Oxygen Therapy- Nasal Cannula; Titrate for SPO2: 90% - 95%   • ECG 12 Lead ED Triage Standing Order; SOA   • ECG 12 Lead Chest Pain   • Insert Peripheral IV   • Insert Peripheral IV   • Inpatient Admission   • CBC & Differential   • Green Top (Gel)   • Lavender Top   • Gold Top - SST   • Light Blue Top         Additional orders considered but not ordered:  Considered CT scan of the head however patient mostly here for cough and congestion.        Differential diagnosis:    Differential includes but not limited to congestive heart failure aspiration pneumonia bacterial pneumonia      Independent interpretation of labs, radiology studies, and discussions with consultants:  ED Course as of 02/08/23 2209 Wed Feb 08, 2023 2151 21:51 EST  Patient presents for cough and congestion.  Has bilateral infiltrates and may be aspiration pneumonia or may be bacterial pneumonia.  Patient could also have congestive heart failure.  He will be given Lasix as well as Rocephin.  Patient discussed Enedelia SHLOMO with A and will be admitted to telemetry to Dr. Stapleton. [SL]       ED Course User Index  [SL] Jeremiah Rico MD                 DIAGNOSIS  Final diagnoses:   Acute congestive heart failure, unspecified heart failure type (HCC)   Pneumonia of both lungs due to infectious organism, unspecified part of lung         DISPOSITION  admit            Latest Documented Vital Signs:  As of 22:09 EST  BP- 118/75 HR- 96 Temp- 96.9 °F (36.1 °C) (Tympanic) O2 sat- 96%              --    Please note that portions of this were completed with a voice recognition program.       Note Disclaimer: At [facility], we believe that sharing information builds trust and better relationships. You are receiving this note because you are receiving care at [facility] or recently visited. It is possible you will see health information before a provider has talked with you about it. This kind of information can be easy to misunderstand. To help you fully understand what it means for your health, we urge you to discuss this note with your provider.           Jeremiah Rico MD  02/08/23 2214       Jeremiah Rico MD  02/08/23 4812

## 2023-02-09 NOTE — CASE MANAGEMENT/SOCIAL WORK
Discharge Planning Assessment  [facility]     Patient Name: Denver Ames  MRN: [id number]  Today's Date: 2/9/2023    Admit Date: 2/8/2023    Plan: DC plan TBD   Discharge Needs Assessment     Row Name 02/09/23 1705       Living Environment    People in Home child(edna), adult    Name(s) of People in Home Living at daughter Lore's house currently    Current Living Arrangements home    Duration at Residence 1 year    Potentially Unsafe Housing Conditions none    Primary Care Provided by self    Provides Primary Care For no one    Family Caregiver if Needed child(edna), adult    Family Caregiver Names Lore and Normal children    Quality of Family Relationships supportive;helpful    Able to Return to Prior Arrangements yes       Resource/Environmental Concerns    Resource/Environmental Concerns none    Transportation Concerns no car       Transition Planning    Patient/Family Anticipates Transition to other (see comments)  TBD    Patient/Family Anticipated Services at Transition other (see comments)  TBD       Discharge Needs Assessment    Equipment Currently Used at Home walker, rolling;commode    Concerns to be Addressed discharge planning    Anticipated Changes Related to Illness inability to care for self    Discharge Facility/Level of Care Needs other (see comments)  TBD    Current Discharge Risk dependent with mobility/activities of daily living               Discharge Plan     Row Name 02/09/23 1708       Plan    Plan DC plan TBD    Patient/Family in Agreement with Plan yes    Plan Comments I spoke with the patient, his daughter, Lore and granddaughter at bedside with his permission.   I introduced myself and explained my role as .  I verified the information on the facesheet and his PCP as Dr. Scottie Santiago.   The patient uses [facility] on [address] in [place] and is able to pay for and  his medications. They would like Meds to Bed enrollment and I updated their  preference in EPIC.  He lives in a single story home with his daughter, Lore for the past year.  There are 3 steps to enter and he is requires assistance to enter but is able to maneuver around the home with the use of his walker, He is dependent with bathing and dressing and some transfers.  One of his family members will pick him up at discharge.  The patient has a rolling walker and a bedside commode at home denied needing any further DME at this time.  The patient’s family is unsure of a discharge plan at this time and would like to allow for his hospital course to assist in determining the next steps.  He is able to return to his daughter or sons house at discharge if able.  They denied having any further questions or concerns at this time. CM will follow for further needs. Candice HUFFMAN RN              Continued Care and Services - Admitted Since 2/8/2023    Coordination has not been started for this encounter.          Demographic Summary     Row Name 02/09/23 9126       General Information    Admission Type inpatient    Arrived From home    Referral Source admission list    Reason for Consult discharge planning    Preferred Language English       Contact Information    Permission Granted to Share Info With                Functional Status    No documentation.                Psychosocial    No documentation.                Abuse/Neglect    No documentation.                Legal    No documentation.                Substance Abuse    No documentation.                Patient Forms    No documentation.                   Candice De Paz RN

## 2023-02-09 NOTE — PLAN OF CARE
Goal Outcome Evaluation:               SLP attempted swallow eval. Patient not appropriate for eval at this time secondary to mental status/reduced alertness. Long discussion with patient's family. They noted difficulty with all PO intake X10 years s/p CABG. Coughing with all fluids. SLP discussed clinical swallow eval and potential need for VFSS. Family agreeable, however do not desire NPO/alternate means of nutrition. Family open to discussion of PO for quality as warranted. SLP to follow for eval as appropriate.

## 2023-02-09 NOTE — PROGRESS NOTES
"Nutrition Services    Patient Name:  Denver Ames  YOB: 1925  MRN: [id number]  Admit Date:  2/8/2023  Assessment Date:  02/09/23    CLINICAL NUTRITION ASSESSMENT     Encounter Information         Reason for Encounter Low BMI, difficulty chewing/swallowing    Admitting Diagnosis Acute congestive heart failure    Pertinent Medical History CHF, CAD, A fib, CABG      Current Issues Pt NPO pending swallow eval. Pt not appropriate for eval at this time 2/2 AMS per SLP notes today. Pt's family reporting pt having difficulty with all PO intake x 10 years after CABG procedure. Family agreeable to VFSS but reported no desire for alternate route or NPO. Pt's current code status is no CPR and limited support. RD will continue to follow course.     Current Nutrition Orders & Evaluation of Intake       Oral Nutrition     Current PO Diet NPO Diet NPO Type: Strict NPO   Supplement    PO Evaluation     Trending % PO Intake    --  Anthropometrics          Height    Weight Height: 177.8 cm (70\")  Weight: 59.3 kg (130 lb 11.7 oz) (02/09/23 0239)    BMI kg/m2 Body mass index is 18.76 kg/m².    Weight Trend/Change Loss, Amount/Timeframe: ~8 lb (5%) weight loss past ~1 month     Weight History  Wt Readings from Last 15 Encounters:   02/09/23 0239 59.3 kg (130 lb 11.7 oz)   02/08/23 1932 64 kg (141 lb)   11/18/22 1312 68 kg (150 lb)   10/29/22 0015 68 kg (150 lb)   10/28/22 1138 68 kg (150 lb)   04/01/22 1404 68.5 kg (151 lb 1.6 oz)   03/31/22 0924 68.5 kg (151 lb 1.6 oz)   03/29/22 1234 66.4 kg (146 lb 4.8 oz)        Estimated/Assessed Needs       Energy Requirements    Height for Calculation  Height: 177.8 cm (70\")   Weight for Calculation Admit weight (64 kg)   Method for Estimation  30 kcal/kg   EST Needs (kcal/day) 1920 kcal       Protein Requirements    Weight for Calculation 64 kg   EST Protein Needs (g/kg) 1.0 - 1.2 gm/kg   EST Daily Needs (g/day) 64-77 gm       Fluid Requirements     Method for Estimation 1 " mL/kcal    Estimated Needs (mL/day)      Labs        Pertinent Labs Reviewed, listed below     Results from last 7 days   Lab Units 02/09/23  0904 02/08/23  1819   SODIUM mmol/L 143 139   POTASSIUM mmol/L 3.4* 4.4   CHLORIDE mmol/L 103 100   CO2 mmol/L 31.0* 27.0   BUN mg/dL 20 20   CREATININE mg/dL 1.12 1.34*   CALCIUM mg/dL 8.6 9.2   BILIRUBIN mg/dL  --  1.2   ALK PHOS U/L  --  170*   ALT (SGPT) U/L  --  15   AST (SGOT) U/L  --  24   GLUCOSE mg/dL 93 142*     Results from last 7 days   Lab Units 02/09/23  0904 02/08/23  1819   HEMOGLOBIN g/dL 11.4* 14.3   HEMATOCRIT % 37.2* 45.4   WBC 10*3/mm3 9.30 15.41*   ALBUMIN g/dL  --  4.0     Results from last 7 days   Lab Units 02/09/23  0904 02/08/23  1819   PLATELETS 10*3/mm3 170 224     COVID19   Date Value Ref Range Status   02/08/2023 Not Detected Not Detected - Ref. Range Final     No results found for: HGBA1C       Medications            Scheduled Medications cefTRIAXone, 1 g, Intravenous, Q24H  furosemide, 40 mg, Intravenous, Q12H  sodium chloride, 10 mL, Intravenous, Q12H        Infusions      PRN Medications •  nitroglycerin  •  sodium chloride  •  sodium chloride  •  sodium chloride     Physical Findings         Skin, GI, Oral, LDA Teeth missing, s/p carcinoma spot removal head ~1 year ago, disoriented, confused, shallow breathing, on oxygen therapy, last documented BM 2/8, bruised skin, stage 2 pressure injury R 2nd toe, s/p amputation R pinky toe, lesion bilateral transverse scalp    NFPE Not applicable at this time   --  PES STATEMENT / NUTRITION DIAGNOSIS      Nutrition Dx Problem  Problem: Inadequate Nutrient Intake  Etiology: Medical Diagnosis and Factors Affecting Nutrition - swallowing difficulty  Signs/Symptoms: NPO, Report of Minimal PO Intake and SLP/Swallow Evaluation    Comment:      NUTRITION INTERVENTION / PLAN OF CARE  Intervention Goal        Intervention Goal(s) Maintain nutrition status, Meet estimated needs, Initiate feeding/diet and No  significant weight loss     Nutrition Intervention        RD Action Await begin PO diet, Follow Tx Progress, Care plan reviewed and Recommend/ordered:      Nutrition Prescription          Diet  PO diet per SLP    Supplement/Snack    EN/PN      Prescription Ordered No changes at this time     Monitor/Evaluation        Monitor Per protocol   Discharge Needs Pending clinical course   Education Education not appropriate at this time     RD to follow up per protocol.    Electronically signed by:  Reena Murray RD  02/09/23 12:22 EST

## 2023-02-09 NOTE — NURSING NOTE
Wound/Ostomy: Consult received regarding skin issue on open wound of scalp. Patient is alert, able to turn with assistance, upon assessment is observed full-thickens  skin and tissue loss,  involving scalp Occiput x2 site, d/t Malignant Neoplasm of skin. He is seen by Dr Romero at outpatient clinic, which ordered Iodosorb gel and cover with opticel  in his  last appointment  on February 7.   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   Patient will needs a low air loss mattress upon transfer out of the observation unit. Please re-consult for any additional needs.

## 2023-02-09 NOTE — ED NOTES
Nursing report ED to floor  Denver Ames  89 y.o.  male    HPI :   Chief Complaint   Patient presents with    Shortness of Breath       Admitting doctor:   Doc Stapleton MD    Admitting diagnosis:   The primary encounter diagnosis was Acute congestive heart failure, unspecified heart failure type (HCC). A diagnosis of Pneumonia of both lungs due to infectious organism, unspecified part of lung was also pertinent to this visit.    Code status:   Current Code Status       Date Active Code Status Order ID Comments User Context       Prior            Allergies:   Patient has no known allergies.    Isolation:   Enhanced Droplet/Contact     Intake and Output    Intake/Output Summary (Last 24 hours) at 2/9/2023 0112  Last data filed at 2/9/2023 0002  Gross per 24 hour   Intake 600 ml   Output --   Net 600 ml       Weight:       02/08/23  1932   Weight: 64 kg (141 lb)       Most recent vitals:   Vitals:    02/09/23 0001 02/09/23 0031 02/09/23 0101 02/09/23 0111   BP: 115/85 126/76 115/65 115/65   BP Location:    Right arm   Patient Position:    Lying   Pulse: 95 97 84 79   Resp:    20   Temp:    98.6 °F (37 °C)   TempSrc:       SpO2:  94% 92% 92%   Weight:       Height:           Active LDAs/IV Access:   Lines, Drains & Airways       Active LDAs       Name Placement date Placement time Site Days    Peripheral IV 02/08/23 2142 Left Antecubital 02/08/23 2142  Antecubital  less than 1                    Labs (abnormal labs have a star):   Labs Reviewed   RESPIRATORY PANEL PCR W/ COVID-19 (SARS-COV-2) [facility] IN-HOUSE, NP SWAB IN [place], 3-4 HR TAT - Abnormal; Notable for the following components:       Result Value    Human Metapneumovirus Detected (*)     All other components within normal limits    Narrative:     In the setting of a positive respiratory panel with a viral infection PLUS a negative procalcitonin without other underlying concern for bacterial infection, consider observing  off antibiotics or discontinuation of antibiotics and continue supportive care. If the respiratory panel is positive for atypical bacterial infection (Bordetella pertussis, Chlamydophila pneumoniae, or Mycoplasma pneumoniae), consider antibiotic de-escalation to target atypical bacterial infection.   COMPREHENSIVE METABOLIC PANEL - Abnormal; Notable for the following components:    Glucose 142 (*)     Creatinine 1.34 (*)     Alkaline Phosphatase 170 (*)     eGFR 48.2 (*)     All other components within normal limits    Narrative:     GFR Normal >60  Chronic Kidney Disease <60  Kidney Failure <15    The GFR formula is only valid for adults with stable renal function between ages 18 and 70.   BNP (IN-HOUSE) - Abnormal; Notable for the following components:    proBNP 9,896.0 (*)     All other components within normal limits    Narrative:     Among patients with dyspnea, NT-proBNP is highly sensitive for the detection of acute congestive heart failure. In addition NT-proBNP of <300 pg/ml effectively rules out acute congestive heart failure with 99% negative predictive value.    Results may be falsely decreased if patient taking Biotin.     TROPONIN - Abnormal; Notable for the following components:    HS Troponin T 42 (*)     All other components within normal limits    Narrative:     High Sensitive Troponin T Reference Range:  <10.0 ng/L- Negative Female for AMI  <15.0 ng/L- Negative Male for AMI  >=10 - Abnormal Female indicating possible myocardial injury.  >=15 - Abnormal Male indicating possible myocardial injury.   Clinicians would have to utilize clinical acumen, EKG, Troponin, and serial changes to determine if it is an Acute Myocardial Infarction or myocardial injury due to an underlying chronic condition.        CBC WITH AUTO DIFFERENTIAL - Abnormal; Notable for the following components:    WBC 15.41 (*)     Neutrophil % 79.3 (*)     Lymphocyte % 12.3 (*)     Immature Grans % 0.7 (*)     Neutrophils, Absolute  12.21 (*)     Monocytes, Absolute 1.04 (*)     Immature Grans, Absolute 0.11 (*)     All other components within normal limits   HIGH SENSITIVITIY TROPONIN T 2HR - Abnormal; Notable for the following components:    HS Troponin T 33 (*)     Troponin T Delta -9 (*)     All other components within normal limits    Narrative:     High Sensitive Troponin T Reference Range:  <10.0 ng/L- Negative Female for AMI  <15.0 ng/L- Negative Male for AMI  >=10 - Abnormal Female indicating possible myocardial injury.  >=15 - Abnormal Male indicating possible myocardial injury.   Clinicians would have to utilize clinical acumen, EKG, Troponin, and serial changes to determine if it is an Acute Myocardial Infarction or myocardial injury due to an underlying chronic condition.        URINALYSIS W/ MICROSCOPIC IF INDICATED (NO CULTURE) - Abnormal; Notable for the following components:    Ketones, UA Trace (*)     Protein, UA Trace (*)     All other components within normal limits    Narrative:     Urine microscopic not indicated.   LACTIC ACID, PLASMA - Abnormal; Notable for the following components:    Lactate 3.9 (*)     All other components within normal limits   BLOOD CULTURE   BLOOD CULTURE   RAINBOW DRAW    Narrative:     The following orders were created for panel order [place] Draw.  Procedure                               Abnormality         Status                     ---------                               -----------         ------                     Green Top (Gel)[[id number]]                                  Final result               Lavender Top[[id number]]                                     Final result               Gold Top - SST[[id number]]                                   Final result               Light Blue Top[[id number]]                                   Final result                 Please view results for these tests on the individual orders.   BASIC METABOLIC PANEL   CBC WITH AUTO DIFFERENTIAL   LACTIC ACID,  REFLEX   CBC AND DIFFERENTIAL    Narrative:     The following orders were created for panel order CBC & Differential.  Procedure                               Abnormality         Status                     ---------                               -----------         ------                     CBC Auto Differential[[id number]]        Abnormal            Final result                 Please view results for these tests on the individual orders.   GREEN TOP   LAVENDER TOP   GOLD TOP - SST   LIGHT BLUE TOP       EKG:   ECG 12 Lead ED Triage Standing Order; SOA   Final Result   HEART RATE= 105  bpm   RR Interval= 571  ms   NM Interval= 46  ms   P Horizontal Axis=   deg   P Front Axis= 0  deg   QRSD Interval= 95  ms   QT Interval= 361  ms   QRS Axis= -3  deg   T Wave Axis= 162  deg   - ABNORMAL ECG -   Atrial fibrillation with rapid V-rate   LVH with secondary repolarization abnormality   Borderline prolonged QT interval   Since prior tracing hr has increased   Electronically Signed By: Alex Rueda ([facility]) 08-Feb-2023 20:33:00   Date and Time of Study: 2023-02-08 17:55:52          Meds given in ED:   Medications   sodium chloride 0.9 % flush 10 mL (has no administration in time range)   sodium chloride 0.9 % flush 10 mL (has no administration in time range)   sodium chloride 0.9 % flush 10 mL (has no administration in time range)   sodium chloride 0.9 % infusion 40 mL (has no administration in time range)   nitroglycerin (NITROSTAT) SL tablet 0.4 mg (has no administration in time range)   cefTRIAXone (ROCEPHIN) 1 g in sodium chloride 0.9 % 100 mL IVPB-VTB (has no administration in time range)   furosemide (LASIX) injection 80 mg (80 mg Intravenous Given 2/8/23 2201)   cefTRIAXone (ROCEPHIN) 1 g in sodium chloride 0.9 % 100 mL IVPB-VTB (0 g Intravenous Stopped 2/8/23 2241)   sodium chloride 0.9 % bolus 500 mL (0 mL Intravenous Stopped 2/9/23 0002)       Imaging results:  CT Chest Without Contrast Diagnostic    Result  Date: 2/8/2023   Mild to moderate right pleural effusion with bilateral pulmonary infiltrates, follow-up recommended.  Pleural plaques versus nodules, further evaluation/follow-up recommended as indicated.  This report was finalized on 2/8/2023 7:42 PM by Dr. Luis Fernando Sage M.D.       Ambulatory status:   -Assist x2. Family states he uses a walker at home.     Social issues:   Social History     Socioeconomic History    Marital status:    Tobacco Use    Smoking status: Never    Smokeless tobacco: Never   Vaping Use    Vaping Use: Never used   Substance and Sexual Activity    Alcohol use: Yes     Comment: OCCAS    Drug use: Never    Sexual activity: Defer       NIH Stroke Scale:         Idania Razo RN  02/09/23 01:12 EST

## 2023-02-10 NOTE — PLAN OF CARE
"Goal Outcome Evaluation:  Plan of Care Reviewed With: patient           Outcome Evaluation: Patient seen for clinical swallow assessment. Long hx of aspiration with liquids per family, no dysphagia work up documented, but family did indicate the patient refused \"stretching\". Pt confused, but oriented to name and . Voice weak. No focal weakness with other oral motor structures. Poor dentition. No overt s/s of aspiration with ice. Immediate cough with thins via cup. No overt s/s of aspiration with honey via spoon/cup or puree. Wet voice with honey via straw, mixed mech soft, and regular solids. Feel patient may have aspirated secretions while masticating cracker. SLP recs honey, no straws, and puree. Meds crushed in puree. Pt is at a high risk for aspiration, will follow with VFSS to further assess. Family discussing GOC with palliative at this time.    Patient was not wearing a face mask during this therapy encounter. Therapist used appropriate personal protective equipment including mask, eye protection and gloves.  Mask used was standard procedure mask. Appropriate PPE was worn during the entire therapy session. Hand hygiene was completed before and after therapy session. Patient is not in enhanced droplet precautions.             "

## 2023-02-10 NOTE — CONSULTS
Purpose of the visit was to evaluate for: goals of care/advanced care planning, support for patient/family, hospice referral/discussion, pain/symptom management, withdrawal of interventions, transfer to comfort care bed/unit and comfort care. Spoke with MD and RN as well as family and discussed palliative care, goals of care, care options, resuscitation status, [facility], [facility] scattered bed status and discharge options.      Assessment:  Patient is palliative care appropriate for inpatient care given: dementia with AMS, dysphagia and genera; debility, Afib, CHF, cardiomyopathy, CAD.  Upon assessment patient is alert and oriented to self. Denies pain or SOA on 2.5L NC, endorses hunger and thirst but is enjoying HT iced tea.   PPS: 40%    Recommendations/Plan: transfer to [facility] for continued discussion/assistance to patient/family regarding palliative care.The plan is to pursue conservative medical management followed by either LTC placement or  inpatient palliative care as indicated.    Other Comments: I spoke with this patient's daughter/HCS Lore and navjot at bedside with son Stephon via phone to offer support and to discuss their goals of care for the patient. All are fairly knowledgeable about the patient's medical history and recent decline. Lore shares that she is the patient's 24 hr caregiver, and she notes a significant functional and cognitive decline these last 2 months. The patient has developed dysphagia with aspiration as well as recurrent infections marked by drastic behavioral changes in the evenings. The family would like to pursue conservative medical management of the patient's acute needs on 4P, including a modified diet, until a VFSS can provide a more clear picture of his swallow deficit. They will likely pursue a comfort diet instead of NPO if the patient fails the VFSS - no NG or PEG for TF per their wishes. Their goal is to continue to promote wellness but are prepared for end-of-life  decision making. We discussed inpatient palliative care as well as [facility] for support at discharge.     Lore feels that she can no longer provide the level of care that he needs at home and would like to pursue LTC options v inpatient palliative care if his condition worsens. She is amenable to speaking with [facility] re: an explanation of benefits. MD and RN notified.    I advised all of our availability and all questions answered.     Rashida Umanzor RN

## 2023-02-10 NOTE — PROGRESS NOTES
Name: Denver Ames ADMIT: 2023   : 1925  PCP: Scottie Santiago MD    MRN: [id number] LOS: 2 days   AGE/SEX: 89 y.o. male  ROOM: 128/1     Subjective   Subjective   No acute events. Patient is more awake and interactive today. Taking some PO with modified textures. Denies new complaints-no CP or dyspnea. His daughter and granddaughter are at bedside.    Objective   Objective   Vital Signs  Temp:  [97.4 °F (36.3 °C)-97.9 °F (36.6 °C)] 97.9 °F (36.6 °C)  Heart Rate:  [] 121  Resp:  [14-18] 16  BP: ()/(68-85) 113/68  SpO2:  [90 %-94 %] 90 %  on  Flow (L/min):  [2] 2;   Device (Oxygen Therapy): nasal cannula  Body mass index is 20.56 kg/m².  Physical Exam  Vitals and nursing note reviewed.   Constitutional:       General: He is not in acute distress.     Appearance: He is not toxic-appearing or diaphoretic.   HENT:      Head:      Comments: Surgical wounds on scalp     Nose: Nose normal.      Mouth/Throat:      Mouth: Mucous membranes are moist.      Pharynx: Oropharynx is clear.   Eyes:      Conjunctiva/sclera: Conjunctivae normal.      Pupils: Pupils are equal, round, and reactive to light.   Cardiovascular:      Rate and Rhythm: Normal rate and regular rhythm.      Pulses: Normal pulses.   Pulmonary:      Effort: Pulmonary effort is normal.      Breath sounds: Rales (right base) present.   Abdominal:      General: Bowel sounds are normal.      Palpations: Abdomen is soft.      Tenderness: There is no abdominal tenderness.   Musculoskeletal:         General: No swelling or tenderness.      Cervical back: Normal range of motion and neck supple.      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      Capillary Refill: Capillary refill takes less than 2 seconds.   Neurological:      General: No focal deficit present.      Mental Status: He is alert.   Psychiatric:         Mood and Affect: Mood normal.         Behavior: Behavior  normal.       Results Review     I reviewed the patient's new clinical results.  Results from last 7 days   Lab Units 02/10/23  0814 02/09/23  0904 02/08/23  1819   WBC 10*3/mm3 8.46 9.30 15.41*   HEMOGLOBIN g/dL 13.1 11.4* 14.3   PLATELETS 10*3/mm3 193 170 224     Results from last 7 days   Lab Units 02/10/23  0814 02/09/23  0904 02/08/23  1819   SODIUM mmol/L 146* 143 139   POTASSIUM mmol/L 3.3* 3.4* 4.4   CHLORIDE mmol/L 101 103 100   CO2 mmol/L 35.7* 31.0* 27.0   BUN mg/dL 19 20 20   CREATININE mg/dL 1.09 1.12 1.34*   GLUCOSE mg/dL 79 93 142*   EGFR mL/min/1.73 61.7 59.7* 48.2*     Results from last 7 days   Lab Units 02/08/23  1819   ALBUMIN g/dL 4.0   BILIRUBIN mg/dL 1.2   ALK PHOS U/L 170*   AST (SGOT) U/L 24   ALT (SGPT) U/L 15     Results from last 7 days   Lab Units 02/10/23  0814 02/09/23  0904 02/08/23  1819   CALCIUM mg/dL 9.2 8.6 9.2   ALBUMIN g/dL  --   --  4.0     Results from last 7 days   Lab Units 02/09/23  0122 02/08/23  2139   LACTATE mmol/L 1.8 3.9*     No results found for: HGBA1C, POCGLU    CT Head Without Contrast    Result Date: 2/10/2023   1. 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 This remains stable in size, although there is slightly higher density material within it on the current study, which may reflect some superimposed subacute to chronic hemorrhage. It is more apparent than on the exam from 11/18/2022. There are also potentially some septations within this subdural collection. MRI could be considered for further assessment, versus continued follow-up.  Radiation dose reduction techniques were utilized, including automated exposure control and exposure modulation based on body size.  This report was finalized on 2/10/2023 5:38 AM by Dr. Olya Wheeler M.D.      CT Chest Without Contrast Diagnostic    Result Date: 2/8/2023   Mild to moderate right pleural effusion with bilateral pulmonary infiltrates, follow-up  recommended.  Pleural plaques versus nodules, further evaluation/follow-up recommended as indicated.  This report was finalized on 2/8/2023 7:42 PM by Dr. Luis Fernando Sage M.D.      I have personally reviewed all medications:  Scheduled Medications  atorvastatin, 20 mg, Oral, Daily  cefTRIAXone, 1 g, Intravenous, Q24H  metoprolol tartrate, 100 mg, Oral, BID  sodium chloride, 10 mL, Intravenous, Q12H  torsemide, 20 mg, Oral, Daily    Infusions   Diet  Diet: Regular/House Diet; No Straw, Feeding Assistance - Nursing; Texture: Pureed (NDD 1); Fluid Consistency: Honey Thick    I have personally reviewed:  [x]  Laboratory   [x]  Microbiology   [x]  Radiology   [x]  EKG/Telemetry  [x]  Cardiology/Vascular   []  Pathology    [x]  Records       Assessment/Plan     Active Hospital Problems    Diagnosis  POA   • **Acute on chronic diastolic CHF (congestive heart failure) (HCC) [I50.33]  Yes   • Human metapneumovirus (hMPV) pneumonia [J12.3]  Yes   • Paroxysmal atrial fibrillation (HCC) [I48.0]  Yes   • Hypertension [I10]  Yes   • Hyperlipidemia [E78.5]  Yes      Resolved Hospital Problems   No resolved problems to display.   Human Metapneumovirus Pneumonia  - will provide supportive care  - he does have some evidence of a superimposed bacterial pneumonia, continue ceftriaxone for now  - pulmonary toilet as able  - has right pleural effusion which I suspect is more from CHF-will follow with diuresis     Delirium  - I do suspect some underlying dementia though this is not formally diagnosed  - probably due to acute illness, will observe delirium precautions  - head CT scan noted with known previous SDH with possible small subacute hemorrhage-family is aware and are wanting conservative management-will avoid blood thinners    Acute on chronic diastolic CHF  - responded well to IV lasix, restart his torsemide  - monitor ins/outs, daily weights, and chemistries     PAF  - rate controlled, not on AC due to falls  - resume home  BB, stop IV metoprolol     Oropharyngeal Dysphagia  - he has been having some issues with 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  DNR.  Discussed with patient, family and nursing staff.  Anticipate discharge TBD timing yet to be determined.    I appreciate palliative care team evaluation for goals of care. I followed up and discussed with the family after today's meeting and they wish to stay on the current course with conservative management of his issues and no escalation of care. They would prefer is he goes to [facility] and this is reasonable-I will place transfer orders. Plan to consider move to full comfort measures if he further declines or is unable to tolerate diet but will see how he does with current measures.    Doc Han MD  [facility]  02/10/23  16:14 EST

## 2023-02-10 NOTE — PLAN OF CARE
Goal Outcome Evaluation:  Plan of Care Reviewed With: patient        Progress: improving  Outcome Evaluation: Good output from purewick. IV abx given. Remains npo. Son at bedsite. Head CT completed. Disoriented to situation.

## 2023-02-10 NOTE — THERAPY EVALUATION
Acute Care - Speech Language Pathology   Swallow Initial Evaluation [facility]     Patient Name: Denver Ames  : 1925  MRN: [id number]  Today's Date: 2/10/2023               Admit Date: 2023    Visit Dx:     ICD-10-CM ICD-9-CM   1. Acute congestive heart failure, unspecified heart failure type (HCC)  I50.9 428.0   2. Pneumonia of both lungs due to infectious organism, unspecified part of lung  J18.9 483.8     Patient Active Problem List   Diagnosis   • Personal history of other malignant neoplasm of skin   • Open wnd of scalp   • Open wound involving head with neck, complicated   • Paroxysmal atrial fibrillation (HCC)   • Coronary artery disease   • History of CHF (congestive heart failure)   • Hyperlipidemia   • Hypertension   • Renal insufficiency   • Subdural hygroma   • Chronic anticoagulation   • Traumatic subdural hygroma   • Acute on chronic diastolic CHF (congestive heart failure) (HCC)   • Controlled atrial fibrillation (HCC)   • Subdural hematoma, post-traumatic   • Cardiomyopathy (HCC)   • Benign essential hypertension   • Presence of aortocoronary bypass graft   • Hyperlipidemia   • Human metapneumovirus (hMPV) pneumonia     Past Medical History:   Diagnosis Date   • Atrial fibrillation (HCC)    • Cancer (HCC)     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   • Chronic anticoagulation     FOR A- FIB   • Coronary artery disease    • History of CHF (congestive heart failure)    • History of COVID-19 2021    HEADACHE   • History of melanoma     SHOULDER   • Hyperlipidemia    • Hypertension    • MI (myocardial infarction) ([facility])    • Renal insufficiency      Past Surgical History:   Procedure Laterality Date   • APPENDECTOMY  1934   • CARDIAC CATHETERIZATION  2011   • CATARACT EXTRACTION W/ INTRAOCULAR LENS  IMPLANT, BILATERAL     • CORONARY ARTERY BYPASS GRAFT  10/01/2011    3 VESSELS   • EXCISION MASS HEAD/NECK N/A 3/31/2022    Procedure: HEAD NECK DEBRIDEMENT, scalp flap closure of open wound, with  "full thickness skin graft to head.;  Surgeon: Shakir Romero MD;  Location: [facility] MAIN OR;  Service: Plastics;  Laterality: N/A;       SLP Recommendation and Plan  SLP Swallowing Diagnosis: suspected pharyngeal dysphagia (02/10/23 1300)     Recommended Precautions and Strategies: upright posture during/after eating, small bites of food and sips of liquid, no straw, assist with feeding (02/10/23 1300)  SLP Rec. for Method of Medication Administration: meds crushed, with puree, as tolerated (02/10/23 1300)     Monitor for Signs of Aspiration: yes, notify SLP if any concerns (02/10/23 1300)  Recommended Diagnostics: reassess via VFSS (MBS) (02/10/23 1300)           Therapy Frequency (Swallow): PRN (02/10/23 1300)  Predicted Duration Therapy Intervention (Days): until discharge (02/10/23 1300)                                        Plan of Care Reviewed With: patient  Outcome Evaluation: Patient seen for clinical swallow assessment. Long hx of aspiration with liquids per family, no dysphagia work up documented, but family did indicate the patient refused \"stretching\". Pt confused, but oriented to name and . Voice weak. No focal weakness with other oral motor structures. Poor dentition. No overt s/s of aspiration with ice. Immediate cough with thins via cup. No overt s/s of aspiration with honey via spoon/cup or puree. Wet voice with honey via straw, mixed mech soft, and regular solids. Feel patient may have aspirated secretions while masticating cracker. SLP recs honey, no straws, and puree. Meds crushed in puree. Pt is at a high risk for aspiration, will follow with VFSS to further assess. Family discussing GOC with palliative at this time.      SWALLOW EVALUATION (last 72 hours)     SLP Adult Swallow Evaluation     Row Name 02/10/23 1300                   Rehab Evaluation    Document Type evaluation  -SH        Patient Effort adequate  -SH           General Information    Patient Profile Reviewed yes  -SH  " "      Pertinent History Of Current Problem Chronic dysphagia since CABG x10 years ago  -        Current Method of Nutrition NPO  -        Precautions/Limitations, Vision vision impairment, bilaterally  -        Precautions/Limitations, Hearing hearing impairment, bilaterally  -        Prior Level of Function-Communication cognitive-linguistic impairment  -        Prior Level of Function-Swallowing other (see comments)  chronic impairment  -        Plans/Goals Discussed with patient;family;agreed upon  -        Barriers to Rehab medically complex  -        Patient's Goals for Discharge patient did not state  -        Family Goals for Discharge patient able to eat/drink without coughing/choking  -           Pain    Additional Documentation Pain Scale: FACES Pre/Post-Treatment (Group)  -           Pain Scale: FACES Pre/Post-Treatment    Pain: FACES Scale, Pretreatment 0-->no hurt  -           Oral Motor Structure and Function    Dentition Assessment teeth are in poor condition;missing teeth  -           Oral Musculature and Cranial Nerve Assessment    Oral Motor General Assessment vocal impairment  -           Clinical Swallow Eval    Clinical Swallow Evaluation Summary Patient seen for clinical swallow assessment. Long hx of aspiration with liquids per family, no dysphagia work up documented, but family did indicate the patient refused \"stretching\". Pt confused, but oriented to name and . Voice weak. No focal weakness with other oral motor structures. Poor dentition. No overt s/s of aspiration with ice. Immediate cough with thins via cup. No overt s/s of aspiration with honey via spoon/cup or puree. Wet voice with honey via straw, mixed mech soft, and regular solids. Feel patient may have aspirated secretions while masticating cracker. SLP recs honey, no straws, and puree. Meds crushed in puree. Pt is at a high risk for aspiration, will follow with VFSS to further assess. Family " discussing GOC with palliative at this time.  -           SLP Evaluation Clinical Impression    SLP Swallowing Diagnosis suspected pharyngeal dysphagia  -           Recommendations    Therapy Frequency (Swallow) PRN  -        Predicted Duration Therapy Intervention (Days) until discharge  -        Recommended Diagnostics reassess via VFSS ([facility])  -        Recommended Precautions and Strategies upright posture during/after eating;small bites of food and sips of liquid;no straw;assist with feeding  -        Oral Care Recommendations Oral Care BID/PRN  -        SLP Rec. for Method of Medication Administration meds crushed;with puree;as tolerated  -        Monitor for Signs of Aspiration yes;notify SLP if any concerns  -           Swallow Goals (SLP)    Swallow LTGs Patient will demonstrate functional swallow for  -           (LTG) Patient will demonstrate functional swallow for    Diet Texture (Demonstrate functional swallow) pureed textures  -        Liquid viscosity (Demonstrate functional swallow) honey/  moderately thick liquids  -        [place] (Demonstrate functional swallow) independently (over 90% accuracy)  -              User Key  (r) = Recorded By, (t) = Taken By, (c) = Cosigned By    Initials Name Effective Dates     Ning Kline MS CCC-SLP 06/16/21 -                 EDUCATION  The patient has been educated in the following areas:   Dysphagia (Swallowing Impairment).        SLP GOALS     Row Name 02/10/23 1300             (LTG) Patient will demonstrate functional swallow for    Diet Texture (Demonstrate functional swallow) pureed textures  -      Liquid viscosity (Demonstrate functional swallow) honey/  moderately thick liquids  -      [place] (Demonstrate functional swallow) independently (over 90% accuracy)  -            User Key  (r) = Recorded By, (t) = Taken By, (c) = Cosigned By    Initials Name Provider Type    Ning Davies MS CCC-SLP Speech and  Language Pathologist                   Time Calculation:    Time Calculation- SLP     Row Name 02/10/23 1418             Time Calculation- SLP    SLP Start Time 1300  -SH      SLP Received On 02/10/23  -         Untimed Charges    11596-CD Eval Oral Pharyng Swallow Minutes 75  -SH         Total Minutes    Untimed Charges Total Minutes 75  -SH       Total Minutes 75  -SH            User Key  (r) = Recorded By, (t) = Taken By, (c) = Cosigned By    Initials Name Provider Type     iNng Kline MS CCC-SLP Speech and Language Pathologist                Therapy Charges for Today     Code Description Service Date Service Provider Modifiers Qty    30547534499  ST EVAL ORAL PHARYNG SWALLOW 5 2/10/2023 Ning Kline MS CCC-SLP GN 1               Ning Kline MS CCC-SLP  2/10/2023

## 2023-02-11 NOTE — MBS/VFSS/FEES
Acute Care - Speech Language Pathology   Swallow Initial Evaluation [facility]     Patient Name: Denver Ames  : 1925  MRN: [id number]  Today's Date: 2023               Admit Date: 2023    Visit Dx:     ICD-10-CM ICD-9-CM   1. Acute congestive heart failure, unspecified heart failure type (HCC)  I50.9 428.0   2. Pneumonia of both lungs due to infectious organism, unspecified part of lung  J18.9 483.8     Patient Active Problem List   Diagnosis   • Personal history of other malignant neoplasm of skin   • Open wnd of scalp   • Open wound involving head with neck, complicated   • Paroxysmal atrial fibrillation (HCC)   • Coronary artery disease   • History of CHF (congestive heart failure)   • Hyperlipidemia   • Hypertension   • Renal insufficiency   • Subdural hygroma   • Chronic anticoagulation   • Traumatic subdural hygroma   • Acute on chronic diastolic CHF (congestive heart failure) (HCC)   • Controlled atrial fibrillation (HCC)   • Subdural hematoma, post-traumatic   • Cardiomyopathy (HCC)   • Benign essential hypertension   • Presence of aortocoronary bypass graft   • Hyperlipidemia   • Human metapneumovirus (hMPV) pneumonia     Past Medical History:   Diagnosis Date   • Atrial fibrillation (HCC)    • Cancer (HCC)     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   • Chronic anticoagulation     FOR A- FIB   • Coronary artery disease    • History of CHF (congestive heart failure)    • History of COVID-19 2021    HEADACHE   • History of melanoma     SHOULDER   • Hyperlipidemia    • Hypertension    • MI (myocardial infarction) ([facility])    • Renal insufficiency      Past Surgical History:   Procedure Laterality Date   • APPENDECTOMY  1934   • CARDIAC CATHETERIZATION  2011   • CATARACT EXTRACTION W/ INTRAOCULAR LENS  IMPLANT, BILATERAL     • CORONARY ARTERY BYPASS GRAFT  10/01/2011    3 VESSELS   • EXCISION MASS HEAD/NECK N/A 3/31/2022    Procedure: HEAD NECK DEBRIDEMENT, scalp flap closure of open wound, with  full thickness skin graft to head.;  Surgeon: Shakir Romero MD;  Location: [facility] OR;  Service: Plastics;  Laterality: N/A;       SLP Recommendation and Plan  Video swallow study completed. Pt presents with moderate to severe oropharyngeal dysphagia. Diffuse pharyngeal weakness and ineffective UES relaxation results in pharyngeal residue with all consistencies, ineffective in complete clearance. Second swallow and liquid wash aids reducing degree of residue. Aspiration occurring with NTL and thins, sensed by the pt indicated via cough, ineffective in complete clearance from the airway. Small quantity penetration with mixed consistencies.     Recommend: HTL and mechanical soft/ground-no mixed consistencies. Medications: crushed in puree with cues for additional swallow s/p. Compensations: additional swallow following solids and liquids, alternate liquids and solids, no straws.     SLP to follow for carryover of dysphagia strategies.     SWALLOW EVALUATION (last 72 hours)     SLP Adult Swallow Evaluation     Row Name 02/11/23 1100       Rehab Evaluation    Document Type evaluation  -AB    Subjective Information no complaints  -AB    Patient Observations alert;cooperative;agree to therapy  -AB    Patient/Family/Caregiver Comments/Observations seen in fluoro suite  -AB    Patient Effort good  -AB    Symptoms Noted During/After Treatment none  -AB       General Information    Patient Profile Reviewed yes  -AB    Current Method of Nutrition pureed;honey-thick liquids  -AB       Pain    Additional Documentation Pain Scale: Numbers Pre/Post-Treatment (Group)  -AB       Pain Scale: Numbers Pre/Post-Treatment    Pretreatment Pain Rating 0/10 - no pain  -AB    Posttreatment Pain Rating 0/10 - no pain  -AB       MBS/VFSS Interpretation    VFSS Summary Radiologist present Dr. Whatley. Video fluoroscopic swallow study completed. Pt seated 90 degree angle, visualized in lateral position. Trials assessed included: HTL  by tsp/cup, NTL by tsp/cup/straw, thins by cup, puree, mechanical soft/mixed consistency. At initial  view, shadowing versus calcification present underside epiglottis, and cricothyroid. Lingual movements repetitive. Poor bolus control. Increased time required for posterior propulsion of the bolus. Mastication mildly extended. Premature spillage to valleculae with HTL by tsp/cup, puree; Pyriform NTL by tsp/cup/straw, mixed consistency, thin liquids. Base of tongue retraction, hyolaryngeal elevation decreased. Epiglottis approximates PPW at C2-C3, incomplete in deflection. Decreased amplitude UES relaxation, likely related to diffuse pharyngeal weakness. As a result of poor UES opening, varying residue across all trials. Moderate valleculae, minimal pyriform residue with NTL by tsp/cup/straw, mechanical soft, thins; moderate valleculae and pyriform residue with HTL by tsp/cup; maximal valleculae/pyriform residue with HTL by cup, puree Residuals decreased from moderate to min-mod; maximal to mod; with cued second swallow. HTL wash following puree reduces to moderate valleculae/pyriform residuals. NTL wash is effective in reducing to moderate valleculae, minimal pyriform following puree. Penetration during the swallow, above the vocal folds, not ejected from the airway with NTL by straw. Cued throat clear does not eject all material from vestibule. Aspiration during the swallow for second attempt NTL by cup, larger bolus, pt senses via cough, effective in ejecting some, not all material from the airway. Question penetration before the swallow, above the vocal folds with mixed consistencies of thin liquid portion - difficult visualization due to material in airway and shadowing underside of epiglottis. Aspiration before the swallow, increased quantity aspiration during the swallow secondary to initiation delay, premature spillage, poor vestibular closure with thins by cup. Senses via cough, cannot eject all  material.  -AB       SLP Communication to Radiology    Severity Level of Dysphagia mod-severe dysphagia;oral dysfunction;pharyngeal dysfunction  -AB    Consistencies Aspirated/Penetrated penetrated and aspirated;thin liquids;nectar-thick liquids;mixed consistency  -AB    Summary Statement Radiologist present Dr. Whatley. Video fluoroscopic swallow study completed. Pt seated 90 degree angle, visualized in lateral position. Trials assessed included: HTL by tsp/cup, NTL by tsp/cup/straw, thins by cup, puree, mechanical soft/mixed consistency. At initial  view, shadowing versus calcification present underside epiglottis, and cricothyroid. Lingual movements repetitive. Poor bolus control. Increased time required for posterior propulsion of the bolus. Mastication mildly extended. Premature spillage to valleculae with HTL by tsp/cup, puree; Pyriform NTL by tsp/cup/straw, mixed consistency, thin liquids. Base of tongue retraction, hyolaryngeal elevation decreased. Epiglottis approximates PPW at C2-C3, incomplete in deflection. Decreased amplitude UES relaxation, likely related to diffuse pharyngeal weakness. As a result of poor UES opening, varying residue across all trials. Moderate valleculae, minimal pyriform residue with NTL by tsp/cup/straw, mechanical soft, thins; moderate valleculae and pyriform residue with HTL by tsp/cup; maximal valleculae/pyriform residue with HTL by cup, puree Residuals decreased from moderate to min-mod; maximal to mod; with cued second swallow. HTL wash following puree reduces to moderate valleculae/pyriform residuals. NTL wash is effective in reducing to moderate valleculae, minimal pyriform following puree. Penetration during the swallow, above the vocal folds, not ejected from the airway with NTL by straw. Cued throat clear does not eject all material from vestibule. Aspiration during the swallow for second attempt NTL by cup, larger bolus, pt senses via cough, effective in ejecting  some, not all material from the airway. Question penetration before the swallow, above the vocal folds with mixed consistencies of thin liquid portion - difficult visualization due to material in airway and shadowing underside of epiglottis. Aspiration before the swallow, increased quantity aspiration during the swallow secondary to initiation delay, premature spillage, poor vestibular closure with thins by cup. Senses via cough, cannot eject all material.  -AB       SLP Evaluation Clinical Impression    SLP Swallowing Diagnosis mild-moderate;oral dysphagia;pharyngeal dysphagia  -AB    Functional Impact risk of aspiration/pneumonia;risk of malnutrition;risk of dehydration  -AB    Rehab Potential/Prognosis, Swallowing re-evaluate goals as necessary  -AB    Swallow Criteria for Skilled Therapeutic Interventions Met demonstrates skilled criteria  -AB       SLP Treatment Clinical Impressions    Care Plan Review evaluation/treatment results reviewed;care plan/treatment goals reviewed;risks/benefits reviewed;patient/other agree to care plan;current/potential barriers reviewed  -AB       Recommendations    Therapy Frequency (Swallow) PRN  -AB    Predicted Duration Therapy Intervention (Days) until discharge  -AB    SLP Diet Recommendation mechanical ground textures;honey thick liquids  -AB    Recommended Diagnostics --    Recommended Precautions and Strategies upright posture during/after eating;small bites of food and sips of liquid;alternate between small bites of food and sips of liquid;multiple swallows per bite of food;multiple swallows per sip of liquid  -AB    Oral Care Recommendations Oral Care BID/PRN  -AB    SLP Rec. for Method of Medication Administration meds crushed;with puree;as tolerated  -AB    Monitor for Signs of Aspiration yes;notify SLP if any concerns  -AB    Anticipated Discharge Disposition (SLP) unknown  -AB       Swallow Goals (SLP)    Swallow LTGs Patient will demonstrate functional swallow for   -AB    Swallow STGs swallow compensatory strategies goal selection (SLP)  -AB    Swallow Compensatory Strategies Goal Selection (SLP) swallow compensatory strategies, SLP goal 1  -AB       (LTG) Patient will demonstrate functional swallow for    Diet Texture (Demonstrate functional swallow) soft to chew (ground) textures  -AB    Liquid viscosity (Demonstrate functional swallow) honey/  moderately thick liquids  -AB    Loda (Demonstrate functional swallow) independently (over 90% accuracy)  -AB    Progress/Outcomes (Demonstrate functional swallow) goal revised this date  -AB       (STG) Swallow Compensatory Strategies Goal 1 (SLP)    Activity (Swallow Compensatory Strategies/Techniques Goal 1, SLP) alternate food/liquid intake;extra swallow per bolus   -AB    Loda/Accuracy (Swallow Compensatory Strategies/Techniques Goal 1, SLP) independently (over 90% accuracy)  -AB    Progress/Outcomes (Swallow Compensatory Strategies/Techniques Goal 1, SLP) new goal  -AB          User Key  (r) = Recorded By, (t) = Taken By, (c) = Cosigned By    Initials Name Effective Dates    AB Basilia Roque, MS CCC-SLP 12/27/22 -                 EDUCATION  The patient has been educated in the following areas:   Dysphagia (Swallowing Impairment) Modified Diet Instruction.        SLP GOALS     Row Name 02/11/23 1100       (LTG) Patient will demonstrate functional swallow for    Diet Texture (Demonstrate functional swallow) soft to chew (ground) textures  -AB    Liquid viscosity (Demonstrate functional swallow) honey/  moderately thick liquids  -AB    Loda (Demonstrate functional swallow) independently (over 90% accuracy)  -AB    Progress/Outcomes (Demonstrate functional swallow) goal revised this date  -AB       (STG) Swallow Compensatory Strategies Goal 1 (SLP)    Activity (Swallow Compensatory Strategies/Techniques Goal 1, SLP) alternate food/liquid intake;extra swallow per bolus   -AB    Loda/Accuracy  (Swallow Compensatory Strategies/Techniques Goal 1, SLP) independently (over 90% accuracy)  -AB    Progress/Outcomes (Swallow Compensatory Strategies/Techniques Goal 1, SLP) new goal  -AB          User Key  (r) = Recorded By, (t) = Taken By, (c) = Cosigned By    Initials Name Provider Type    Basilia Diaz MS CCC-SLP Speech and Language Pathologist                   Time Calculation:    Time Calculation- SLP     Row Name 02/11/23 1152             Time Calculation- SLP    SLP Received On 02/11/23  -AB            User Key  (r) = Recorded By, (t) = Taken By, (c) = Cosigned By    Initials Name Provider Type    Basilia Diaz, MS CCC-SLP Speech and Language Pathologist                Therapy Charges for Today     Code Description Service Date Service Provider Modifiers Qty    46102023153 HC ST MOTION FLUORO EVAL SWALLOW 6 2/11/2023 Basilia Roque, MS CCC-SLP GN 1               Basilia Roque MS CCC-SLP  2/11/2023

## 2023-02-11 NOTE — PLAN OF CARE
Problem: Adult Inpatient Plan of Care  Goal: Plan of Care Review  Outcome: Ongoing, Progressing  Flowsheets (Taken 2/11/2023 0426)  Progress: no change  Plan of Care Reviewed With:   patient   son  Outcome Evaluation: Patient was admitted from Observation unit. Patient is pleasantly confused. Son at bedside. Patient is on IV antibiotics and Potassium protocol. No PRN meds needed this shift. Patient takes pills crushed in apple sauce. Bed alarm on. Coccyx red but blanchable covered with mepilex for protection. Will continue to monitor vital signs, labs and comfort.   Goal Outcome Evaluation:  Plan of Care Reviewed With: patient, son        Progress: no change  Outcome Evaluation: Patient was admitted from Observation unit. Patient is pleasantly confused. Son at bedside. Patient is on IV antibiotics and Potassium protocol. No PRN meds needed this shift. Patient takes pills crushed in apple sauce. Bed alarm on. Coccyx red but blanchable covered with mepilex for protection. Will continue to monitor vital signs, labs and comfort.

## 2023-02-11 NOTE — PLAN OF CARE
Goal Outcome Evaluation:  Plan of Care Reviewed With: patient        Progress: no change    Video swallow study completed. Pt presents with moderate to severe oropharyngeal dysphagia. Diffuse pharyngeal weakness and ineffective UES relaxation results in pharyngeal residue with all consistencies, ineffective in complete clearance. Second swallow and liquid wash aids reducing degree of residue. Aspiration occurring with NTL and thins, sensed by the pt indicated via cough, ineffective in complete clearance from the airway. Small quantity penetration with mixed consistencies.      Recommend: HTL and mechanical soft/ground-no mixed consistencies. Medications: crushed in puree with cues for additional swallow s/p. Compensations: additional swallow following solids and liquids, alternate liquids and solids, no straws.     SLP to follow for carryover of dysphagia strategies.

## 2023-02-11 NOTE — PROGRESS NOTES
Name: Denver Ames ADMIT: 2023   : 1925  PCP: Scottie Santiago MD    MRN: [id number] LOS: 3 days   AGE/SEX: 89 y.o. male  ROOM: [facility]     Subjective   Subjective   No acute events. Patient denies new complaints. Had VFSS and is tolerating modified diet.     Objective   Objective   Vital Signs  Temp:  [96 °F (35.6 °C)-97.8 °F (36.6 °C)] 96 °F (35.6 °C)  Heart Rate:  [] 92  Resp:  [16-18] 16  BP: ()/(58-92) 110/69  SpO2:  [90 %-100 %] 95 %  on  Flow (L/min):  [4] 4;   Device (Oxygen Therapy): nasal cannula  Body mass index is 21.04 kg/m².  Physical Exam  Vitals and nursing note reviewed.   Constitutional:       General: He is not in acute distress.     Appearance: He is not toxic-appearing or diaphoretic.   HENT:      Head:      Comments: Surgical wounds on scalp     Nose: Nose normal.      Mouth/Throat:      Mouth: Mucous membranes are moist.      Pharynx: Oropharynx is clear.   Eyes:      Conjunctiva/sclera: Conjunctivae normal.      Pupils: Pupils are equal, round, and reactive to light.   Cardiovascular:      Rate and Rhythm: Normal rate and regular rhythm.      Pulses: Normal pulses.   Pulmonary:      Effort: Pulmonary effort is normal.      Breath sounds: No rales.   Abdominal:      General: Bowel sounds are normal.      Palpations: Abdomen is soft.      Tenderness: There is no abdominal tenderness.   Musculoskeletal:         General: No swelling or tenderness.      Cervical back: Normal range of motion and neck supple.      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      Capillary Refill: Capillary refill takes less than 2 seconds.   Neurological:      General: No focal deficit present.      Mental Status: He is alert.   Psychiatric:         Mood and Affect: Mood normal.         Behavior: Behavior normal.       Results Review     I reviewed the patient's new clinical results.  Results from last 7 days   Lab Units  02/11/23  0149 02/10/23  0814 02/09/23  0904 02/08/23  1819   WBC 10*3/mm3 10.83* 8.46 9.30 15.41*   HEMOGLOBIN g/dL 12.5* 13.1 11.4* 14.3   PLATELETS 10*3/mm3 193 193 170 224     Results from last 7 days   Lab Units 02/11/23  0149 02/10/23  0814 02/09/23  0904 02/08/23  1819   SODIUM mmol/L 142 146* 143 139   POTASSIUM mmol/L 4.0 3.3* 3.4* 4.4   CHLORIDE mmol/L 101 101 103 100   CO2 mmol/L 30.0* 35.7* 31.0* 27.0   BUN mg/dL 24* 19 20 20   CREATININE mg/dL 1.37* 1.09 1.12 1.34*   GLUCOSE mg/dL 113* 79 93 142*   EGFR mL/min/1.73 46.9* 61.7 59.7* 48.2*     Results from last 7 days   Lab Units 02/08/23  1819   ALBUMIN g/dL 4.0   BILIRUBIN mg/dL 1.2   ALK PHOS U/L 170*   AST (SGOT) U/L 24   ALT (SGPT) U/L 15     Results from last 7 days   Lab Units 02/11/23  0149 02/10/23  0814 02/09/23  0904 02/08/23  1819   CALCIUM mg/dL 8.7 9.2 8.6 9.2   ALBUMIN g/dL  --   --   --  4.0     Results from last 7 days   Lab Units 02/09/23  0122 02/08/23  2139   LACTATE mmol/L 1.8 3.9*     No results found for: HGBA1C, POCGLU    CT Head Without Contrast    Result Date: 2/10/2023   1. 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 This remains stable in size, although there is slightly higher density material within it on the current study, which may reflect some superimposed subacute to chronic hemorrhage. It is more apparent than on the exam from 11/18/2022. There are also potentially some septations within this subdural collection. MRI could be considered for further assessment, versus continued follow-up.  Radiation dose reduction techniques were utilized, including automated exposure control and exposure modulation based on body size.  This report was finalized on 2/10/2023 5:38 AM by Dr. Olya Wheeler M.D.      FL Video Swallow With Speech Single Contrast    Result Date: 2/11/2023    As described.  This report was finalized on 2/11/2023 1:42 PM by Dr. Luis Fernando Sage M.D.       I have personally reviewed all medications:  Scheduled Medications  atorvastatin, 20 mg, Oral, Daily  cefTRIAXone, 1 g, Intravenous, Q24H  metoprolol tartrate, 100 mg, Oral, BID  sodium chloride, 10 mL, Intravenous, Q12H    Infusions   Diet  Diet: Regular/House Diet; No Straw, Feeding Assistance - Nursing, No Mixed Consistencies; Texture: Mechanical Ground (NDD 2); Fluid Consistency: Honey Thick    I have personally reviewed:  [x]  Laboratory   []  Microbiology   []  Radiology   []  EKG/Telemetry  []  Cardiology/Vascular   []  Pathology    []  Records       Assessment/Plan     Active Hospital Problems    Diagnosis  POA   • **Acute on chronic diastolic CHF (congestive heart failure) (HCC) [I50.33]  Yes   • Human metapneumovirus (hMPV) pneumonia [J12.3]  Yes   • Paroxysmal atrial fibrillation (HCC) [I48.0]  Yes   • Hypertension [I10]  Yes   • Hyperlipidemia [E78.5]  Yes      Resolved Hospital Problems   No resolved problems to display.   Human Metapneumovirus Pneumonia  - continue supportive care  - continue ceftriaxone for superimposed bacterial pneumonia  - pulmonary toilet as able  - has right pleural effusion which I suspect likely more from CHF-appears improved with diuresis     Delirium  - I do suspect some underlying dementia though this is not formally diagnosed  - probably due to acute illness, will observe delirium precautions  - head CT scan noted with known previous SDH with possible small subacute hemorrhage-family is aware and are wanting conservative management-will avoid blood thinners    Acute on chronic diastolic CHF  - responded well to IV lasix then switched to torsemide, serum creatinine increased today so will hold his torsemide  - monitor ins/outs, daily weights, and chemistries     PAF  - rate controlled, not on AC due to falls  - resume home BB, stop IV metoprolol     Oropharyngeal Dysphagia  - he has been having 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  DNR.  Discussed with patient, family and nursing staff.  Anticipate discharge TBD timing yet to be determined.      Doc Han MD  [facility]  02/11/23  16:05 EST

## 2023-02-11 NOTE — PLAN OF CARE
Goal Outcome Evaluation:  Plan of Care Reviewed With: patient, family        Progress: no change  Outcome Evaluation: VSS. Pt has had no complaints of pain or discomfort. Family at bedside. Pt went down for video swallow study today, Pt is now on a mechanical ground, honey thick with no straws diet. Pt needs to be taking extra swallows in between bites and alternating food and liquid to make sure he is clearing all the food from his mouth/throat. Daughter at bedside. No needs at this time.

## 2023-02-12 NOTE — PROGRESS NOTES
Name: Denver Ames ADMIT: 2023   : 1925  PCP: Scottie Santiago MD    MRN: [id number] LOS: 4 days   AGE/SEX: 89 y.o. male  ROOM: [facility]     Subjective   Subjective   No acute events. Patient denies new complaints. Taking PO, actually tolerating his modified diet fairly well.   His daughter in law is at bedside.    Objective   Objective   Vital Signs  Temp:  [96 °F (35.6 °C)-98 °F (36.7 °C)] 96.8 °F (36 °C)  Heart Rate:  [] 82  Resp:  [16-18] 16  BP: ()/(61-72) 108/68  SpO2:  [92 %-98 %] 97 %  on  Flow (L/min):  [4] 4;   Device (Oxygen Therapy): room air  Body mass index is 20.02 kg/m².  Physical Exam  Vitals and nursing note reviewed.   Constitutional:       General: He is not in acute distress.     Appearance: He is not toxic-appearing or diaphoretic.   HENT:      Head:      Comments: Surgical wounds on scalp     Nose: Nose normal.      Mouth/Throat:      Mouth: Mucous membranes are moist.      Pharynx: Oropharynx is clear.   Eyes:      Conjunctiva/sclera: Conjunctivae normal.      Pupils: Pupils are equal, round, and reactive to light.   Cardiovascular:      Rate and Rhythm: Normal rate and regular rhythm.      Pulses: Normal pulses.   Pulmonary:      Effort: Pulmonary effort is normal.      Breath sounds: No rales.   Abdominal:      General: Bowel sounds are normal.      Palpations: Abdomen is soft.      Tenderness: There is no abdominal tenderness.   Musculoskeletal:         General: No swelling or tenderness.      Cervical back: Normal range of motion and neck supple.      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      Capillary Refill: Capillary refill takes less than 2 seconds.   Neurological:      General: No focal deficit present.      Mental Status: He is alert and oriented to person, place, and time.   Psychiatric:         Mood and Affect: Mood normal.         Behavior: Behavior normal.       Results Review     I  reviewed the patient's new clinical results.  Results from last 7 days   Lab Units 02/12/23  0721 02/11/23  0149 02/10/23  0814 02/09/23  0904   WBC 10*3/mm3 7.05 10.83* 8.46 9.30   HEMOGLOBIN g/dL 12.6* 12.5* 13.1 11.4*   PLATELETS 10*3/mm3 179 193 193 170     Results from last 7 days   Lab Units 02/12/23  0721 02/11/23  0149 02/10/23  0814 02/09/23  0904   SODIUM mmol/L 144 142 146* 143   POTASSIUM mmol/L 3.5 4.0 3.3* 3.4*   CHLORIDE mmol/L 104 101 101 103   CO2 mmol/L 32.0* 30.0* 35.7* 31.0*   BUN mg/dL 20 24* 19 20   CREATININE mg/dL 1.06 1.37* 1.09 1.12   GLUCOSE mg/dL 101* 113* 79 93   EGFR mL/min/1.73 63.8 46.9* 61.7 59.7*     Results from last 7 days   Lab Units 02/08/23  1819   ALBUMIN g/dL 4.0   BILIRUBIN mg/dL 1.2   ALK PHOS U/L 170*   AST (SGOT) U/L 24   ALT (SGPT) U/L 15     Results from last 7 days   Lab Units 02/12/23  0721 02/11/23  0149 02/10/23  0814 02/09/23  0904 02/08/23  1819   CALCIUM mg/dL 8.8 8.7 9.2 8.6 9.2   ALBUMIN g/dL  --   --   --   --  4.0     Results from last 7 days   Lab Units 02/09/23  0122 02/08/23  2139   LACTATE mmol/L 1.8 3.9*     No results found for: HGBA1C, POCGLU    FL Video Swallow With Speech Single Contrast    Result Date: 2/11/2023    As described.  This report was finalized on 2/11/2023 1:42 PM by Dr. Luis Fernando Sage M.D.      I have personally reviewed all medications:  Scheduled Medications  atorvastatin, 20 mg, Oral, Daily  cefTRIAXone, 1 g, Intravenous, Q24H  metoprolol tartrate, 100 mg, Oral, BID  sodium chloride, 10 mL, Intravenous, Q12H    Infusions   Diet  Diet: Regular/House Diet; No Straw, Feeding Assistance - Nursing, No Mixed Consistencies; Texture: Mechanical Ground (NDD 2); Fluid Consistency: Honey Thick    I have personally reviewed:  [x]  Laboratory   []  Microbiology   []  Radiology   []  EKG/Telemetry  []  Cardiology/Vascular   []  Pathology    []  Records       Assessment/Plan     Active Hospital Problems    Diagnosis  POA   • **Acute on chronic  diastolic CHF (congestive heart failure) ([facility]) [I50.33]  Yes   • Human metapneumovirus (hMPV) pneumonia [J12.3]  Yes   • Paroxysmal atrial fibrillation (HCC) [I48.0]  Yes   • Hypertension [I10]  Yes   • Hyperlipidemia [E78.5]  Yes      Resolved Hospital Problems   No resolved problems to display.   Human Metapneumovirus Pneumonia  - continue supportive care  - continue ceftriaxone for superimposed bacterial pneumonia  - pulmonary toilet as able  - has right pleural effusion which I suspect likely more from CHF-appears improved with diuresis     Delirium  - I do suspect some underlying dementia though this is not formally diagnosed  - probably due to acute illness, will observe delirium precautions  - head CT scan noted with known previous SDH with possible small subacute hemorrhage-family is aware and are wanting conservative management-will avoid blood thinners    Acute on chronic diastolic CHF  - responded well to IV lasix then switched to torsemide the serum creatinine increased yesterday so this was held  - resume torsemide today at 10mg as he is taking more PO  - monitor ins/outs, daily weights, and chemistries  - repeat CXR to evaluate his pleural effusion     PAF  - rate controlled, not on AC due to falls  - continue metoprolol     Oropharyngeal Dysphagia  - he has been having some issues with 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  DNR.  Discussed with patient, family and nursing staff.  Anticipate discharge home with HH vs SNU facility in 1-2 days.      Doc MARTIN  MD Guille  [facility]  02/12/23  12:36 EST

## 2023-02-12 NOTE — NURSING NOTE
I met with this patient and his DIL at bedside to offer support. The patient is alert and oriented, denies pain or SOA on 4L NC. No needs identifed at this time, will continue to follow.    Rashida Umanzor RN

## 2023-02-12 NOTE — PLAN OF CARE
Goal Outcome Evaluation:  Plan of Care Reviewed With: patient, family        Progress: no change  Outcome Evaluation: VSS. Pt has had no complaints of pain or discomfort. Family at bedside throughout the day. Meds crushed in applecause. Pt has an L inguinal hernia, family is concerned. Please have MD assess tomorrow. Hernia is soft and does not cause pt any pain. No needs at this time.

## 2023-02-12 NOTE — PLAN OF CARE
Problem: Adult Inpatient Plan of Care  Goal: Plan of Care Review  Outcome: Ongoing, Progressing  Flowsheets (Taken 2/12/2023 0427)  Progress: no change  Plan of Care Reviewed With:   patient   son  Outcome Evaluation: Patient slept between care. Son at bedside. Patient takes meds crushed in applesauce. IV antibiotics given as ordered. No complaints of pain or discomfort. Bed alarm on. Will continue to monitor vital signs, labs and comfort.   Goal Outcome Evaluation:  Plan of Care Reviewed With: patient, son        Progress: no change  Outcome Evaluation: Patient slept between care. Son at bedside. Patient takes meds crushed in applesauce. IV antibiotics given as ordered. No complaints of pain or discomfort. Bed alarm on. Will continue to monitor vital signs, labs and comfort.

## 2023-02-13 NOTE — PLAN OF CARE
Goal Outcome Evaluation:            Patient states goal for the day is to ambulate to the chair; patient alert and oriented to self and place; up to chair with physical therapy at 0930, ambulated to stretcher to be transported to CT, ambulated to chair upon return from CT, gait belt and two person assist required; patient in chair, chair alarm activated for safety, family at bedside, call bell within reach.

## 2023-02-13 NOTE — PROGRESS NOTES
Name: Denver Ames ADMIT: 2023   : 1925  PCP: Scottie Santiago MD    MRN: [id number] LOS: 5 days   AGE/SEX: 89 y.o. male  ROOM: [facility]     Subjective   Subjective   No acute events. Patient denies new complaints. Taking PO, actually tolerating his modified diet fairly well.   He has family at bedside    Objective   Objective   Vital Signs  Temp:  [96.4 °F (35.8 °C)-97.7 °F (36.5 °C)] 97.1 °F (36.2 °C)  Heart Rate:  [77-96] 78  Resp:  [16] 16  BP: ()/(55-75) 109/72  SpO2:  [91 %-100 %] 99 %  on  Flow (L/min):  [4] 4;   Device (Oxygen Therapy): nasal cannula  Body mass index is 19.9 kg/m².  Physical Exam  Vitals and nursing note reviewed.   Constitutional:       Appearance: He is ill-appearing (Chronically).   HENT:      Head:      Comments: Surgical wounds on scalp  Cardiovascular:      Rate and Rhythm: Normal rate and regular rhythm.   Pulmonary:      Effort: Pulmonary effort is normal.      Breath sounds: No rales.   Abdominal:      General: Bowel sounds are normal.      Palpations: Abdomen is soft.      Tenderness: There is no abdominal tenderness.   Musculoskeletal:         General: No swelling or tenderness.      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   Neurological:      General: No focal deficit present.      Mental Status: He is alert and oriented to person, place, and time.   Psychiatric:         Mood and Affect: Mood normal.         Behavior: Behavior normal.       Results Review     I reviewed the patient's new clinical results.  Results from last 7 days   Lab Units 02/13/23  0419 02/12/23  0721 02/11/23  0149 02/10/23  0814   WBC 10*3/mm3 8.05 7.05 10.83* 8.46   HEMOGLOBIN g/dL 12.6* 12.6* 12.5* 13.1   PLATELETS 10*3/mm3 187 179 193 193     Results from last 7 days   Lab Units 02/13/23  0419 02/12/23  0721 02/11/23  0149 02/10/23  0814   SODIUM mmol/L 144 144 142 146*   POTASSIUM mmol/L 5.1 3.5 4.0 3.3*   CHLORIDE  mmol/L 105 104 101 101   CO2 mmol/L 32.2* 32.0* 30.0* 35.7*   BUN mg/dL 18 20 24* 19   CREATININE mg/dL 0.82 1.06 1.37* 1.09   GLUCOSE mg/dL 109* 101* 113* 79   EGFR mL/min/1.73 79.9 63.8 46.9* 61.7     Results from last 7 days   Lab Units 02/08/23  1819   ALBUMIN g/dL 4.0   BILIRUBIN mg/dL 1.2   ALK PHOS U/L 170*   AST (SGOT) U/L 24   ALT (SGPT) U/L 15     Results from last 7 days   Lab Units 02/13/23  0419 02/12/23  0721 02/11/23  0149 02/10/23  0814 02/09/23  0904 02/08/23  1819   CALCIUM mg/dL 8.8 8.8 8.7 9.2   < > 9.2   ALBUMIN g/dL  --   --   --   --   --  4.0    < > = values in this interval not displayed.     Results from last 7 days   Lab Units 02/09/23  0122 02/08/23  2139   LACTATE mmol/L 1.8 3.9*     No results found for: HGBA1C, POCGLU    XR Chest 1 View    Result Date: 2/12/2023  Minimal residual right pleural effusion. Borderline heart size. Tortuous aorta.  This report was finalized on 2/12/2023 1:51 PM by Dr. Luis Fernando Sage M.D.      I have personally reviewed all medications:  Scheduled Medications  atorvastatin, 20 mg, Oral, Daily  cefTRIAXone, 1 g, Intravenous, Q24H  metoprolol tartrate, 100 mg, Oral, BID  sodium chloride, 10 mL, Intravenous, Q12H  torsemide, 10 mg, Oral, Daily    Infusions   Diet  Diet: Regular/House Diet; No Straw, Feeding Assistance - Nursing, No Mixed Consistencies; Texture: Mechanical Ground (NDD 2); Fluid Consistency: Honey Thick    I have personally reviewed:  [x]  Laboratory   []  Microbiology   []  Radiology   []  EKG/Telemetry  []  Cardiology/Vascular   []  Pathology    []  Records       Assessment/Plan     Active Hospital Problems    Diagnosis  POA   • **Acute on chronic diastolic CHF (congestive heart failure) (HCC) [I50.33]  Yes   • Human metapneumovirus (hMPV) pneumonia [J12.3]  Yes   • Paroxysmal atrial fibrillation (HCC) [I48.0]  Yes   • Hypertension [I10]  Yes   • Hyperlipidemia [E78.5]  Yes      Resolved Hospital Problems   No resolved problems to display.        Human Metapneumovirus Pneumonia  - continue supportive care  - he has completed 5 days of ceftriaxone for superimposed bacterial pneumonia, will discontinue  - pulmonary toilet as able  - has right pleural effusion which I suspect likely more from CHF-appears improved with diuresis; stable on repeat x-ray     Delirium  - suspect some underlying dementia though this is not formally diagnosed  - probably due to acute illness, will observe delirium precautions  - head CT scan noted with known previous SDH with possible small subacute hemorrhage-family is aware and are wanting conservative management-will avoid blood thinners    Acute on chronic diastolic CHF  - responded well to IV lasix then switched to torsemide the serum creatinine increased yesterday so this was held  - torsemide restarted at 10 mg daily  - creatinine has improved  - repeat CXR improved     PAF  - rate controlled, not on AC due to falls  - continue metoprolol     Oropharyngeal Dysphagia  - he has been having some issues with 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  DNR.  Discussed with patient and family.  Anticipate discharge home with HH vs SNU facility tomorrow.      Reji Dumont MD  [facility]  02/13/23  14:07 EST

## 2023-02-13 NOTE — THERAPY EVALUATION
Patient Name: Denver Ames  : 1925    MRN: [id number]                              Today's Date: 2023       Admit Date: 2023    Visit Dx:     ICD-10-CM ICD-9-CM   1. Acute congestive heart failure, unspecified heart failure type (HCC)  I50.9 428.0   2. Pneumonia of both lungs due to infectious organism, unspecified part of lung  J18.9 483.8     Patient Active Problem List   Diagnosis   • Personal history of other malignant neoplasm of skin   • Open wnd of scalp   • Open wound involving head with neck, complicated   • Paroxysmal atrial fibrillation (HCC)   • Coronary artery disease   • History of CHF (congestive heart failure)   • Hyperlipidemia   • Hypertension   • Renal insufficiency   • Subdural hygroma   • Chronic anticoagulation   • Traumatic subdural hygroma   • Acute on chronic diastolic CHF (congestive heart failure) (HCC)   • Controlled atrial fibrillation (HCC)   • Subdural hematoma, post-traumatic   • Cardiomyopathy (HCC)   • Benign essential hypertension   • Presence of aortocoronary bypass graft   • Hyperlipidemia   • Human metapneumovirus (hMPV) pneumonia     Past Medical History:   Diagnosis Date   • Atrial fibrillation (HCC)    • Cancer (HCC)     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   • Chronic anticoagulation     FOR A- FIB   • Coronary artery disease    • History of CHF (congestive heart failure)    • History of COVID-19 2021    HEADACHE   • History of melanoma     SHOULDER   • Hyperlipidemia    • Hypertension    • MI (myocardial infarction) (HCC)    • Renal insufficiency      Past Surgical History:   Procedure Laterality Date   • APPENDECTOMY  193   • CARDIAC CATHETERIZATION  2011   • CATARACT EXTRACTION W/ INTRAOCULAR LENS  IMPLANT, BILATERAL     • CORONARY ARTERY BYPASS GRAFT  10/01/2011    3 VESSELS   • EXCISION MASS HEAD/NECK N/A 3/31/2022    Procedure: HEAD NECK DEBRIDEMENT, scalp flap closure of open wound, with full thickness skin graft to head.;  Surgeon: Shakir Romero  MD Vik;  Location: [facility] MAIN OR;  Service: Plastics;  Laterality: N/A;      General Information     Row Name 02/13/23 1205          Physical Therapy Time and Intention    Document Type evaluation  -AR     Mode of Treatment physical therapy  -AR     Row Name 02/13/23 1205          General Information    Patient Profile Reviewed yes  -AR     Prior Level of Function min assist:;mod assist:  dtr has been providing ~24/7 assist for about last yr, 1 near falls, uses walker in home  -AR     Existing Precautions/Restrictions fall  -AR     Barriers to Rehab none identified  -AR     Row Name 02/13/23 1205          Living Environment    People in Home child(edna), adult  -AR     Row Name 02/13/23 1205          Home Main Entrance    Number of Stairs, Main Entrance three  -AR     Stair Railings, Main Entrance railings safe and in good condition  -AR     Row Name 02/13/23 1205          Cognition    Orientation Status (Cognition) oriented to;person;place  -AR     Row Name 02/13/23 1205          Safety Issues, Functional Mobility    Impairments Affecting Function (Mobility) balance;strength;endurance/activity tolerance  -AR           User Key  (r) = Recorded By, (t) = Taken By, (c) = Cosigned By    Initials Name Provider Type    AR Mindi Cm PT Physical Therapist               Mobility     Row Name 02/13/23 1206          Bed Mobility    Bed Mobility supine-sit;sit-supine  -AR     Supine-Sit [place] (Bed Mobility) minimum assist (75% patient effort)  -AR     Sit-Supine [place] (Bed Mobility) not tested  -AR     Assistive Device (Bed Mobility) bed rails;head of bed elevated  -AR     Row Name 02/13/23 1206          Sit-Stand Transfer    Sit-Stand [place] (Transfers) minimum assist (75% patient effort);contact guard  -AR     Assistive Device (Sit-Stand Transfers) walker, front-wheeled  -AR     Row Name 02/13/23 1206          Gait/Stairs (Locomotion)    [place] Level (Gait) contact guard  -AR      Assistive Device (Gait) walker, front-wheeled  -AR     Distance in Feet (Gait) 5' few shuffling steps  -AR     Deviations/Abnormal Patterns (Gait) gait speed decreased;festinating/shuffling  -AR     Bilateral Gait Deviations heel strike decreased;forward flexed posture  -AR           User Key  (r) = Recorded By, (t) = Taken By, (c) = Cosigned By    Initials Name Provider Type    AR iMndi Cm, PT Physical Therapist               Obj/Interventions     Row Name 02/13/23 1207          Range of Motion Comprehensive    Comment, General Range of Motion B LE WFL  -AR     Row Name 02/13/23 1207          Strength Comprehensive (MMT)    Comment, General Manual Muscle Testing (MMT) Assessment B LE -4/5  -AR     Row Name 02/13/23 1207          Motor Skills    Therapeutic Exercise --  B AP, LAQ, standing marches 10x, cues to stay in place  -AR     Row Name 02/13/23 1207          Balance    Balance Assessment standing dynamic balance  -AR     Dynamic Standing Balance minimal assist  -AR     Position/Device Used, Standing Balance walker, rolling  -AR           User Key  (r) = Recorded By, (t) = Taken By, (c) = Cosigned By    Initials Name Provider Type    AR Mindi Cm, PT Physical Therapist               Goals/Plan     Row Name 02/13/23 1215          Bed Mobility Goal 1 (PT)    Activity/Assistive Device (Bed Mobility Goal 1, PT) bed mobility activities, all  -AR     [place] Level/Cues Needed (Bed Mobility Goal 1, PT) standby assist  -AR     Time Frame (Bed Mobility Goal 1, PT) 1 week  -AR     Row Name 02/13/23 1215          Transfer Goal 1 (PT)    Activity/Assistive Device (Transfer Goal 1, PT) bed-to-chair/chair-to-bed;sit-to-stand/stand-to-sit;walker, rolling  -AR     [place] Level/Cues Needed (Transfer Goal 1, PT) contact guard required  -AR     Row Name 02/13/23 1215          Gait Training Goal 1 (PT)    Activity/Assistive Device (Gait Training Goal 1, PT) gait (walking locomotion);walker, rolling  -AR      [place] Level (Gait Training Goal 1, PT) contact guard required  -AR     Distance (Gait Training Goal 1, PT) 100  -AR     Time Frame (Gait Training Goal 1, PT) 1 week  -AR     Row Name 02/13/23 1215          Therapy Assessment/Plan (PT)    Planned Therapy Interventions (PT) balance training;bed mobility training;gait training;home exercise program;patient/family education;transfer training;ROM (range of motion);stair training;strengthening  -AR           User Key  (r) = Recorded By, (t) = Taken By, (c) = Cosigned By    Initials Name Provider Type    AR Mindi Cm, PT Physical Therapist               Clinical Impression     Row Name 02/13/23 1208          Pain    Pretreatment Pain Rating 0/10 - no pain  -AR     Posttreatment Pain Rating 0/10 - no pain  -AR     Pain Intervention(s) Repositioned  -AR     Row Name 02/13/23 1208          Plan of Care Review    Outcome Evaluation Pt admitted from [facility] with CHF.  Dtr reports normally using walker, has assist for transfers, 1 near fall recently.  Today pt demonstrates generalized weakness, impaired balance and activity tolerance from baseline bu abl eto ambulate 3' w/min A using RW. Performed few sitting and standing exercises.  Recommend DC to SNU; discussed with pt, dtr and CCP.  -AR     Row Name 02/13/23 1208          Therapy Assessment/Plan (PT)    Rehab Potential (PT) good, to achieve stated therapy goals  -AR     Criteria for Skilled Interventions Met (PT) yes  -AR     Therapy Frequency (PT) 5 times/wk  -AR     Row Name 02/13/23 1208          Vital Signs    O2 Delivery Pre Treatment room air  -AR     Row Name 02/13/23 1208          Positioning and Restraints    Pre-Treatment Position in bed  -AR     Post Treatment Position chair  -AR     In Chair notified nsg;reclined;sitting;call light within reach;encouraged to call for assist;exit alarm on;with family/caregiver  -AR           User Key  (r) = Recorded By, (t) = Taken By, (c) = Cosigned By    Initials  Name Provider Type    AR Mindi Cm, PT Physical Therapist               Outcome Measures     Row Name 02/13/23 1216          How much help from another person do you currently need...    Turning from your back to your side while in flat bed without using bedrails? 3  -AR     Moving from lying on back to sitting on the side of a flat bed without bedrails? 3  -AR     Moving to and from a bed to a chair (including a wheelchair)? 3  -AR     Standing up from a chair using your arms (e.g., wheelchair, bedside chair)? 3  -AR     Climbing 3-5 steps with a railing? 1  -AR     To walk in hospital room? 2  -AR     AM-PAC 6 Clicks Score (PT) 15  -AR     Highest level of mobility 4 --> Transferred to chair/commode  -AR     Row Name 02/13/23 1216          Functional Assessment    Outcome Measure Options AM-PAC 6 Clicks Basic Mobility (PT)  -AR           User Key  (r) = Recorded By, (t) = Taken By, (c) = Cosigned By    Initials Name Provider Type    AR Mindi Cm PT Physical Therapist                             Physical Therapy Education     Title: PT OT SLP Therapies (In Progress)     Topic: Physical Therapy (In Progress)     Point: Mobility training (In Progress)     Learning Progress Summary           Patient Acceptance, E, NR by AR at 2/13/2023 1217                   Point: Home exercise program (In Progress)     Learning Progress Summary           Patient Acceptance, E, NR by AR at 2/13/2023 1217                   Point: Body mechanics (In Progress)     Learning Progress Summary           Patient Acceptance, E, NR by AR at 2/13/2023 1217                   Point: Precautions (In Progress)     Learning Progress Summary           Patient Acceptance, E, NR by AR at 2/13/2023 1217                               User Key     Initials Effective Dates Name Provider Type Discipline    AR 06/16/21 -  Mindi Cm, PT Physical Therapist PT              PT Recommendation and Plan  Planned Therapy Interventions (PT):  balance training, bed mobility training, gait training, home exercise program, patient/family education, transfer training, ROM (range of motion), stair training, strengthening  Outcome Evaluation: Pt admitted from [facility] with CHF.  Dtr reports normally using walker, has assist for transfers, 1 near fall recently.  Today pt demonstrates generalized weakness, impaired balance and activity tolerance from baseline bu abl eto ambulate 3' w/min A using RW. Performed few sitting and standing exercises.  Recommend DC to SNU; discussed with pt, dtr and CCP.     Time Calculation:    PT Charges     Row Name 02/13/23 1204             Time Calculation    Start Time 0854  -AR      Stop Time 0926  -AR      Time Calculation (min) 32 min  -AR      PT Received On 02/13/23  -AR      PT - Next Appointment 02/14/23  -AR      PT Goal Re-Cert Due Date 02/20/23  -AR            User Key  (r) = Recorded By, (t) = Taken By, (c) = Cosigned By    Initials Name Provider Type    AR Mindi Cm, PT Physical Therapist              Therapy Charges for Today     Code Description Service Date Service Provider Modifiers Qty    41806050392 HC PT EVAL MOD COMPLEXITY 4 2/13/2023 Mindi Cm, PT GP 1    18736591395 HC PT THER PROC EA 15 MIN 2/13/2023 Mindi Cm, PT GP 1          PT G-Codes  Outcome Measure Options: AM-PAC 6 Clicks Basic Mobility (PT)  AM-PAC 6 Clicks Score (PT): 15  PT Discharge Summary  Anticipated Discharge Disposition (PT): skilled nursing facility    Mindi Cm PT  2/13/2023

## 2023-02-13 NOTE — PLAN OF CARE
Goal Outcome Evaluation:              Outcome Evaluation: Pt admitted from [facility] with CHF.  Dtr reports normally using walker, has assist for transfers, 1 near fall recently.  Today pt demonstrates generalized weakness, impaired balance and activity tolerance from baseline bu abl eto ambulate 3' w/min A using RW. Performed few sitting and standing exercises.  Recommend DC to SNU; discussed with pt, dtr and CCP.

## 2023-02-13 NOTE — CONSULTS
[facility] evaluation complete. Met with patient, son and daughter in room. Patient up in chair, alert and oriented to person and place. Discussed disease progression and GOC. Patient has been living with daughter Lore for around 1 year. She states that up until a few weeks ago, he was taking care of his own needs for the most part. The last few weeks he has needed assist with all ADLs. The patient is also experiencing sundowning which has worsened recently too. Family states that at night he becomes confused, agitated and combative. Per family, he is typically oriented during the daytime. Daughter became tearful at times and expressed caregiver fatigue. She stated that in order to come back to her home he has to get rehab and get some strength back.   When I stopped by the room a few hours ago, before daughter and son arrived, the patient was smiling and very pleasant. He looked relaxed and was able to engage in conversation. At the time of this assessment, he is restless, fidgeting with his covers throughout visit. He is also very agitated and argumentative. Son and daughter argued with him throughout the visit about leaving his covers on, as well as need for CIELO. Patient kept saying that he is going to leave and hire people to take care of him at home. He was confused during some of the conversation and thought he was already at home and we were talking about taking him back to the hospital. Daughter is worried about what she will do if he refuses CIELO and has to come back to her home. I assured her that the doctors and  will come up with a safe discharge plan. Informed them that [facility] will follow up daily for dc plans/decline. Patient denied any needs or discomfort. Daughter requesting MD come look at new inguinal hernia. Updated nurse and suggested she obtain order for medication for agitation/restlessness so patient and family can get some rest tonight.   Thanks for the referral! Please call  customer support with any questions or change in patient condition.    Mago Bravo RN  Referral and Admissions Coordinator  [facility]  [phone]

## 2023-02-13 NOTE — PLAN OF CARE
Goal Outcome Evaluation:  Plan of Care Reviewed With: patient, daughter, family        Progress: no change  Outcome Evaluation: VFSS follow up completed. Pt upright in chair, multiple family members at bedside. Family with 100% comprehension for diet/liquid levels. Discussed oropharyngeal dysphagia, deficits, including terms “aspiration,” and “residue.” Discussed positive indicator pt senses aspiration via cough, however cough ineffective in clearing material. Family with questions “Is he going to drink this thick stuff forever?” “Is pasta okay for him to eat?” “How do we thicken drinks at home?” Discussed plan of care with pt and family - multiple swallows, alternate liquids and solids. Attempted hard swallow cued with pt, cannot complete, attempts throat clear and cough. Pt does consume HTL via tsp with multiple swallows and slow rate with no cues. All recommendations provided in written format and included - how to thicken liquids, thickener information,  written diet compensations, compensations as pt plans to discharge home. Additionaly reiterated - if pt to pursue hospice/comfort care, may choose to eat or drink what he wishes with known risk of aspiration, however would be sensed via cough. Thick liquids aiding in comfort as well by reducing cough. Family verbalizing understanding, state goal is to take pt home, will decide further goals as indicated.     Recommendations remain consistent from VFSS: HTL and mechanical soft/ground-no mixed consistencies. Medications: crushed in puree with cues for additional swallow s/p. Compensations: additional swallow following solids and liquids, alternate liquids and solids, no straws.      SLP available for education or therapy pending ongoing intervention level pursued and pt and family goals of care.

## 2023-02-13 NOTE — PROGRESS NOTES
Name: Denver Ames ADMIT: 2023   : 1925  PCP: Scottie Santiago MD    MRN: [id number] LOS: 5 days   AGE/SEX: 89 y.o. male  [facility]     Subjective   Subjective   Patient appears generally weak, elderly, frail, & in no apparent distress.  Tolerating diet.  ANGEL at bedside.     Review of Systems   Constitutional: Negative for chills and fever.   Respiratory: Negative for cough and shortness of breath.    Cardiovascular: Negative for chest pain and leg swelling.   Gastrointestinal: Negative for abdominal pain, constipation, diarrhea, nausea and vomiting.   Genitourinary: Negative for difficulty urinating and dysuria.   Musculoskeletal: Positive for gait problem (due to debility / activity intolerance).        Objective   Objective   Vital Signs  Temp:  [96.4 °F (35.8 °C)-97.7 °F (36.5 °C)] 97.1 °F (36.2 °C)  Heart Rate:  [77-96] 78  Resp:  [16] 16  BP: ()/(55-75) 109/72  SpO2:  [98 %-100 %] 99 %  on  Flow (L/min):  [4] 4;   Device (Oxygen Therapy): nasal cannula  Body mass index is 19.9 kg/m².     Physical Exam  Constitutional:       General: He is not in acute distress.     Appearance: He is not toxic-appearing.   Cardiovascular:      Rate and Rhythm: Normal rate.   Pulmonary:      Effort: Pulmonary effort is normal.      Comments: Diminished on expiration posteriorly  Abdominal:      General: Bowel sounds are normal.      Palpations: Abdomen is soft.   Musculoskeletal:      Right lower leg: No edema.      Left lower leg: No edema.   Neurological:      Mental Status: He is alert.       Results Review     I reviewed the patient's new clinical results.  Results from last 7 days   Lab Units 02/13/23  0419 02/12/23  0721 02/11/23  0149 02/10/23  0814   WBC 10*3/mm3 8.05 7.05 10.83* 8.46   HEMOGLOBIN g/dL 12.6* 12.6* 12.5* 13.1   PLATELETS 10*3/mm3 187 179 193 193     Results from last 7 days   Lab Units 02/13/23  0419 02/12/23  0721 02/11/23  0149 02/10/23  0814   SODIUM mmol/L 144 144 142  146*   POTASSIUM mmol/L 5.1 3.5 4.0 3.3*   CHLORIDE mmol/L 105 104 101 101   CO2 mmol/L 32.2* 32.0* 30.0* 35.7*   BUN mg/dL 18 20 24* 19   CREATININE mg/dL 0.82 1.06 1.37* 1.09   GLUCOSE mg/dL 109* 101* 113* 79   EGFR mL/min/1.73 79.9 63.8 46.9* 61.7     Results from last 7 days   Lab Units 02/08/23  1819   ALBUMIN g/dL 4.0   BILIRUBIN mg/dL 1.2   ALK PHOS U/L 170*   AST (SGOT) U/L 24   ALT (SGPT) U/L 15     Results from last 7 days   Lab Units 02/13/23  0419 02/12/23  0721 02/11/23  0149 02/10/23  0814 02/09/23  0904 02/08/23  1819   CALCIUM mg/dL 8.8 8.8 8.7 9.2   < > 9.2   ALBUMIN g/dL  --   --   --   --   --  4.0    < > = values in this interval not displayed.     Results from last 7 days   Lab Units 02/09/23  0122 02/08/23  2139   LACTATE mmol/L 1.8 3.9*     No results found for: HGBA1C, POCGLU    XR Chest 1 View    Result Date: 2/12/2023  Minimal residual right pleural effusion. Borderline heart size. Tortuous aorta.  This report was finalized on 2/12/2023 1:51 PM by Dr. Luis Fernando Sage M.D.      I have personally reviewed all medications:  Scheduled Medications  atorvastatin, 20 mg, Oral, Daily  metoprolol tartrate, 100 mg, Oral, BID  sodium chloride, 10 mL, Intravenous, Q12H  torsemide, 10 mg, Oral, Daily    Infusions   Diet  Diet: Regular/House Diet; No Straw, Feeding Assistance - Nursing, No Mixed Consistencies; Texture: Mechanical Ground (NDD 2); Fluid Consistency: Honey Thick    I have personally reviewed:  [x]  Laboratory   [x]  Microbiology   [x]  Radiology   [x]  EKG/Telemetry  [x]  Cardiology/Vascular   [x]  Pathology    [x]  Records       Assessment/Plan     Active Hospital Problems    Diagnosis  POA   • **Acute on chronic diastolic CHF (congestive heart failure) (HCC) [I50.33]  Yes   • Human metapneumovirus (hMPV) pneumonia [J12.3]  Yes   • Paroxysmal atrial fibrillation (HCC) [I48.0]  Yes   • Hypertension [I10]  Yes   • Hyperlipidemia [E78.5]  Yes      Resolved Hospital Problems   No resolved  problems to display.       89 y.o. male admitted with Acute on chronic diastolic CHF (congestive heart failure) (HCC).    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  - monitor ins/outs, daily weights, and chemistries  - repeat CXR as per above     PAF  - rate controlled with metoprolol continued  - not on AC due to falls & above     Oropharyngeal Dysphagia  - intermittent issue since CABG > 10 years ago  - probably worsened due to above issues vs chronic progression  - Modified diet per SLP following VFSS   - family not interested in artificial feeding      Hx of SDH  - clinically he looks much better overall  - see plan above     · SCDs for DVT prophylaxis.  · Full code.  · Discussed with patient & ANGEL.  · Anticipate discharge pending repeat CT head / plan home with HH vs SNF--CCP following      CHAR Spear  [facility]  02/13/23  15:54 EST

## 2023-02-13 NOTE — NURSING NOTE
I met with this patient and his ANGEL at bedside to offer support. The patient is sitting upright in chair, alert and oriented to self and place only but pleasantly conversant, denies pain or SOA on 4L NC. Endorses good appetite.  No needs identifed at this time, will continue to follow.     Rashida Umanzor RN

## 2023-02-13 NOTE — PLAN OF CARE
Problem: Adult Inpatient Plan of Care  Goal: Plan of Care Review  Outcome: Ongoing, Progressing  Flowsheets (Taken 2/13/2023 0600)  Progress: no change  Plan of Care Reviewed With:   patient   daughter  Outcome Evaluation: Patient slept between care. Bed alarm on. Daughter at bedside. Patient takes his meds crushed in applesauce. IV Rocephine given. PRN potassium coverage given. Will continue to monitor vital signs, labs and comfort.   Goal Outcome Evaluation:  Plan of Care Reviewed With: patient, daughter        Progress: no change  Outcome Evaluation: Patient slept between care. Bed alarm on. Daughter at bedside. Patient takes his meds crushed in applesauce. IV Rocephine given. PRN potassium coverage given. Will continue to monitor vital signs, labs and comfort.

## 2023-02-14 PROBLEM — K40.90 LEFT INGUINAL HERNIA: Status: ACTIVE | Noted: 2023-01-01

## 2023-02-14 NOTE — PLAN OF CARE
Goal Outcome Evaluation:  Plan of Care Reviewed With: patient        Progress: improving  Outcome Evaluation: Pt seen by PT for treatment today. Pt asleep but easily awakens to voice. Pt required MinAX2 with bed mobility with pt having some difficulty following sequencing cues. Pt able to stand with Kirk with rwx. Pt ambulated 8ft with rwx, MinAX2. Pt required frequent verbal cues for posture correction. Pt fatigues quickly and becomes forward flexed. Max cues to  keep knees extended during ambulation. Will continue to progress pt as able.

## 2023-02-14 NOTE — PROGRESS NOTES
Discharge Planning Assessment  [facility]     Patient Name: Denver Ames  MRN: [id number]  Today's Date: 2/14/2023    Admit Date: 2/8/2023    Plan: DC plan TBD   Discharge Needs Assessment    No documentation.                Discharge Plan     Row Name 02/14/23 1726       Plan    Plan Comments Spoke with both facilities and they are following and will let me know if they will have a bed tomorrow. VERNON Vee RN [facility]              Continued Care and Services - Admitted Since 2/8/2023     Destination     Service Provider Request Status Selected Services Address Phone Fax Patient Preferred    [facility] [place] Considering  Pending bed availability N/A [address] [facility] [address] [phone] --    [facility] Pending - Request Sent N/A [address] [facility] [phone] --    [facility] Declined  Bed not available N/A [address] [facility] [place] [address] [phone] --              Expected Discharge Date and Time     Expected Discharge Date Expected Discharge Time    Feb 15, 2023          Demographic Summary    No documentation.                Functional Status    No documentation.                Psychosocial    No documentation.                Abuse/Neglect    No documentation.                Legal    No documentation.                Substance Abuse    No documentation.                Patient Forms    No documentation.                   Ivett Vee, RANJANA

## 2023-02-14 NOTE — THERAPY TREATMENT NOTE
Patient Name: Denver Ames  : 1925    MRN: [id number]                              Today's Date: 2023       Admit Date: 2023    Visit Dx:     ICD-10-CM ICD-9-CM   1. Acute congestive heart failure, unspecified heart failure type (HCC)  I50.9 428.0   2. Pneumonia of both lungs due to infectious organism, unspecified part of lung  J18.9 483.8     Patient Active Problem List   Diagnosis   • Personal history of other malignant neoplasm of skin   • Open wnd of scalp   • Open wound involving head with neck, complicated   • Paroxysmal atrial fibrillation (HCC)   • Coronary artery disease   • History of CHF (congestive heart failure)   • Hyperlipidemia   • Hypertension   • Renal insufficiency   • Subdural hygroma   • Chronic anticoagulation   • Traumatic subdural hygroma   • Acute on chronic diastolic CHF (congestive heart failure) (HCC)   • Controlled atrial fibrillation (HCC)   • Subdural hematoma, post-traumatic   • Cardiomyopathy (HCC)   • Benign essential hypertension   • Presence of aortocoronary bypass graft   • Hyperlipidemia   • Human metapneumovirus (hMPV) pneumonia     Past Medical History:   Diagnosis Date   • Atrial fibrillation (HCC)    • Cancer (HCC)     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   • Chronic anticoagulation     FOR A- FIB   • Coronary artery disease    • History of CHF (congestive heart failure)    • History of COVID-19 2021    HEADACHE   • History of melanoma     SHOULDER   • Hyperlipidemia    • Hypertension    • MI (myocardial infarction) (HCC)    • Renal insufficiency      Past Surgical History:   Procedure Laterality Date   • APPENDECTOMY  193   • CARDIAC CATHETERIZATION  2011   • CATARACT EXTRACTION W/ INTRAOCULAR LENS  IMPLANT, BILATERAL     • CORONARY ARTERY BYPASS GRAFT  10/01/2011    3 VESSELS   • EXCISION MASS HEAD/NECK N/A 3/31/2022    Procedure: HEAD NECK DEBRIDEMENT, scalp flap closure of open wound, with full thickness skin graft to head.;  Surgeon: Shakir Romero  MD Vik;  Location: [facility] MAIN OR;  Service: Plastics;  Laterality: N/A;      General Information     Row Name 02/14/23 1140          Physical Therapy Time and Intention    Document Type therapy note (daily note)  -     Mode of Treatment individual therapy;physical therapy  -     Row Name 02/14/23 1140          General Information    Existing Precautions/Restrictions fall  -     Row Name 02/14/23 1140          Cognition    Orientation Status (Cognition) oriented to;person;place  -     Row Name 02/14/23 1140          Safety Issues, Functional Mobility    Impairments Affecting Function (Mobility) balance;endurance/activity tolerance;strength  -           User Key  (r) = Recorded By, (t) = Taken By, (c) = Cosigned By    Initials Name Provider Type    EB Alberta Tadeo PTA Physical Therapist Assistant               Mobility     Row Name 02/14/23 1140          Bed Mobility    Supine-Sit Cook (Bed Mobility) minimum assist (75% patient effort);2 person assist;verbal cues  -EB     Sit-Supine Cook (Bed Mobility) not tested  -EB     Assistive Device (Bed Mobility) bed rails;head of bed elevated  -EB     Comment, (Bed Mobility) pt had some difficulty following sequencing cues. needed increased assistance with upper trunk elevated.  -     Row Name 02/14/23 1140          Sit-Stand Transfer    Sit-Stand Cook (Transfers) minimum assist (75% patient effort)  -EB     Assistive Device (Sit-Stand Transfers) walker, front-wheeled  -EB     Comment, (Sit-Stand Transfer) cues to correct posterior lean.  -     Row Name 02/14/23 1140          Gait/Stairs (Locomotion)    Cook Level (Gait) minimum assist (75% patient effort);2 person assist  -EB     Assistive Device (Gait) walker, front-wheeled  -EB     Distance in Feet (Gait) 8ft  -EB     Deviations/Abnormal Patterns (Gait) gait speed decreased;tracey decreased;stride length decreased;festinating/shuffling  -EB     Bilateral Gait Deviations  heel strike decreased;forward flexed posture  -EB     Comment, (Gait/Stairs) multiple cues for pt to correct for upright posture. Pt increasingly flexed as pt fatigues. cues for pt to stop and extend bilateral knees.  -           User Key  (r) = Recorded By, (t) = Taken By, (c) = Cosigned By    Initials Name Provider Type     Alberta Tadeo PTA Physical Therapist Assistant               Obj/Interventions     Row Name 02/14/23 1144          Motor Skills    Therapeutic Exercise --  BLE: AP and LAQs (X15)  -     Row Name 02/14/23 1144          Balance    Balance Assessment sitting static balance;sitting dynamic balance  -     Static Sitting Balance standby assist  -     Dynamic Sitting Balance standby assist  -           User Key  (r) = Recorded By, (t) = Taken By, (c) = Cosigned By    Initials Name Provider Type    Alberta Castelan PTA Physical Therapist Assistant               Goals/Plan    No documentation.                Clinical Impression     Row Name 02/14/23 1144          Plan of Care Review    Plan of Care Reviewed With patient  -EB     Progress improving  -     Outcome Evaluation Pt seen by PT for treatment today. Pt asleep but easily awakens to voice. Pt required MinAX2 with bed mobility with pt having some difficulty following sequencing cues. Pt able to stand with Kirk with rwx. Pt ambulated 8ft with rwx, MinAX2. Pt required frequent verbal cues for posture correction. Pt fatigues quickly and becomes forward flexed. Max cues to  keep knees extended during ambulation. Will continue to progress pt as able.  -     Row Name 02/14/23 1144          Therapy Assessment/Plan (PT)    Therapy Frequency (PT) 5 times/wk  -     Row Name 02/14/23 1144          Positioning and Restraints    Pre-Treatment Position in bed  -     Post Treatment Position chair  -EB     In Chair reclined;call light within reach;encouraged to call for assist;exit alarm on;with family/caregiver  -           User Key  (r) =  Recorded By, (t) = Taken By, (c) = Cosigned By    Initials Name Provider Type    Alberta Castelan PTA Physical Therapist Assistant               Outcome Measures     Row Name 02/14/23 1151          How much help from another person do you currently need...    Turning from your back to your side while in flat bed without using bedrails? 3  -EB     Moving from lying on back to sitting on the side of a flat bed without bedrails? 2  -EB     Moving to and from a bed to a chair (including a wheelchair)? 3  -EB     Standing up from a chair using your arms (e.g., wheelchair, bedside chair)? 3  -EB     Climbing 3-5 steps with a railing? 1  -EB     To walk in hospital room? 2  -EB     AM-PAC 6 Clicks Score (PT) 14  -EB     Highest level of mobility 4 --> Transferred to chair/commode  -EB           User Key  (r) = Recorded By, (t) = Taken By, (c) = Cosigned By    Initials Name Provider Type    Alberta Castelan PTA Physical Therapist Assistant                             Physical Therapy Education     Title: PT OT SLP Therapies (In Progress)     Topic: Physical Therapy (Done)     Point: Mobility training (Done)     Learning Progress Summary           Patient Acceptance, E,D, VU,NR by EB at 2/14/2023 1151    Acceptance, E, NR by AR at 2/13/2023 1217                   Point: Home exercise program (Done)     Learning Progress Summary           Patient Acceptance, E,D, VU,NR by EB at 2/14/2023 1151    Acceptance, E, NR by AR at 2/13/2023 1217                   Point: Body mechanics (Done)     Learning Progress Summary           Patient Acceptance, E,D, VU,NR by EB at 2/14/2023 1151    Acceptance, E, NR by AR at 2/13/2023 1217                   Point: Precautions (Done)     Learning Progress Summary           Patient Acceptance, E,D, VU,NR by EB at 2/14/2023 1151    Acceptance, E, NR by AR at 2/13/2023 1217                               User Key     Initials Effective Dates Name Provider Type Discipline    AR 06/16/21 -  Toi  Mindi, PT Physical Therapist PT    EB 06/16/21 -  Alberta Tadeo PTA Physical Therapist Assistant PT              PT Recommendation and Plan     Plan of Care Reviewed With: patient  Progress: improving  Outcome Evaluation: Pt seen by PT for treatment today. Pt asleep but easily awakens to voice. Pt required MinAX2 with bed mobility with pt having some difficulty following sequencing cues. Pt able to stand with Kirk with rwx. Pt ambulated 8ft with rwx, MinAX2. Pt required frequent verbal cues for posture correction. Pt fatigues quickly and becomes forward flexed. Max cues to  keep knees extended during ambulation. Will continue to progress pt as able.     Time Calculation:    PT Charges     Row Name 02/14/23 1139             Time Calculation    Start Time 1005  -EB      Stop Time 1020  -EB      Time Calculation (min) 15 min  -EB      PT Received On 02/14/23  -EB      PT - Next Appointment 02/15/23  -         Time Calculation- PT    Total Timed Code Minutes- PT 15 minute(s)  -EB            User Key  (r) = Recorded By, (t) = Taken By, (c) = Cosigned By    Initials Name Provider Type    EB Alberta Tadeo PTA Physical Therapist Assistant              Therapy Charges for Today     Code Description Service Date Service Provider Modifiers Qty    64034160194 HC PT THERAPEUTIC ACT EA 15 MIN 2/14/2023 Alberta Tadeo PTA GP 1    64938198974 HC PT THER SUPP EA 15 MIN 2/14/2023 Alberta Tadeo PTA GP 1          PT G-Codes  Outcome Measure Options: AM-PAC 6 Clicks Basic Mobility (PT)  AM-PAC 6 Clicks Score (PT): 14       Alberta Tadeo PTA  2/14/2023

## 2023-02-14 NOTE — PROGRESS NOTES
Discharge Planning Assessment  [facility]     Patient Name: Denver Ames  MRN: [id number]  Today's Date: 2/14/2023    Admit Date: 2/8/2023    Plan: DC plan TBD   Discharge Needs Assessment    No documentation.                Discharge Plan     Row Name 02/14/23 1144       Plan    Plan Comments Spoke with the patient's daughter regarding discharge places and gave her the list of SNU's and she states she does want [facility] to evaluate both of their facilities. She will get back with [facility] on her other choices. VERNON Vee RN CCP              Continued Care and Services - Admitted Since 2/8/2023     Destination     Service Provider Request Status Selected Services Address Phone Fax Patient Preferred    [facility] [place] Pending - Request Sent N/A [address] [id number] [phone] --    [facility] [place] Pending - Request Sent N/A [address] [facility] [address] [phone] --              Expected Discharge Date and Time     Expected Discharge Date Expected Discharge Time    Feb 15, 2023          Demographic Summary    No documentation.                Functional Status    No documentation.                Psychosocial    No documentation.                Abuse/Neglect    No documentation.                Legal    No documentation.                Substance Abuse    No documentation.                Patient Forms    No documentation.                   Ivett Vee, RANJANA

## 2023-02-14 NOTE — PROGRESS NOTES
Spoke with patient's dgtr, son, and granddgtr at bedside. Patient sleeping soundly throughout discussion. Family stated they are looking into SNF placement for patient as they are under the impression that he is medically ready for discharge.     Family confirmed that they do not want life prolonging interventions or to have his care escalated. They expressed their goal is to have acute issues stabilized and focus on symptom management as needed to maintain comfort. They stated that zyprexa has been managing his agitation in the evenings. Dgtr noted that given patient's decline and increase in care needs she is not able to care for patient at home and needs placement. They want to see how patient does at rehab though they understand possibility that patient may not be able to tolerate rehab, retain skills d/t his cognition or may have recurrent hospitalizations.     Explained hospice services and palliative care services. Family reported they met with hospice and were told he does not meet criteria for hospice at this time. Discussed [facility] for outpatient support with ongoing GOC discussions, symptom management as needed, care coordination, and transition to hospice when appropriate. Family agreeable to initiating [facility] services, sent referral on their behalf. Advised family of palliative team availability and contact information if any further needs arise. Will sign off for now. Thank you for the referral.

## 2023-02-14 NOTE — PROGRESS NOTES
Nutrition Services    Patient Name:  Denver Ames  YOB: 1925  MRN: [id number]  Admit Date:  2/8/2023    Follow-up note. Visited pt p/t Natalie MAYA's note regarding family's GOC wishes. During conversation with pt's family, they expected d/c today, but in the event that pt stays admitted, they were agreeable to changing pt's diet to pureed, as they stated he is having difficulty with the regular/mechanical ground diet. Will adjust today to pureed. Discussed adding a supplement, such as Scott added to HTL, for added protein; pt's family stated he has had Scott in the past and were open to adding it to pt's diet, if intakes don't improve after pureed diet re-instated (and if not d/c'd). Current PO trend: 0-25%. Pt sleeping during entirety of visit; NFPE was not appropriate at this time.     Given current GOC, RD will remain available PRN.       Electronically signed by:  Brittanie Cortez  02/14/23 15:31 EST

## 2023-02-14 NOTE — DISCHARGE PLACEMENT REQUEST
"Hui Luther GEORGES (89 y.o. Male)     Date of Birth   11/08/1925    Social Security Number       Address   [address] [place] [address]    Home Phone   [phone]    MRN   [id number]       Sabianism   Voodoo    Marital Status                               Admission Date   2/8/23    Admission Type   Emergency    Admitting Provider   Doc Han MD    Attending Provider   Reji Dumont MD    Department, Room/Bed   [address], P492/1       Discharge Date       Discharge Disposition       Discharge Destination                               Attending Provider: Reji Dumont MD    Allergies: No Known Allergies    Isolation: Contact   Infection: Human Metapneumovirus  (02/08/23)   Code Status: No CPR    Ht: 177.8 cm (70\")   Wt: 64.4 kg (141 lb 15.6 oz)    Admission Cmt: None   Principal Problem: Acute on chronic diastolic CHF (congestive heart failure) (HCC) [I50.33]                 Active Insurance as of 2/8/2023     Primary Coverage     Payor Plan Insurance Group Employer/Plan Group    MEDICARE MEDICARE A & B      Payor Plan Address Payor Plan Phone Number Payor Plan Fax Number Effective Dates    [address] [phone]  11/1/1990 - None Entered    [facility] [id number]       Subscriber Name Subscriber Birth Date Member ID       LUTHER AMES 11/8/1925 [id number]           Secondary Coverage     Payor Plan Insurance Group Employer/Plan Group    MISC MC SUP   MISC MC SUP                   Coverage Address Coverage Phone Number Coverage Fax Number Effective Dates    [address] [phone]  1/1/2022 - None Entered    [facility] [address]       Subscriber Name Subscriber Birth Date Member ID       LUTHER AMES 11/8/1925 [id number]                 Emergency Contacts      (Rel.) Home Phone Work Phone Mobile Phone    DARSHAN AGUILAR (POA) (Daughter) -- -- [phone]    Luther Ames Jr (POA-FINANCIAL) (Son) [phone] -- --    "

## 2023-02-14 NOTE — CONSULTS
General Surgery Consultation    Consulting Physician: Lorenza Berg MD  Referring Physician: Reji Dumont MD    Reason for consultation: Left inguinal hernia    CC: Unobtainable, patient is confused    HPI:   The patient is a confused 89 y.o. male that presented to the hospital 6 days ago with acute onset shortness of breath and cough.  He has been meeting with the palliative care and hospice team due to his advanced age and frailty.  I have been consulted for a left inguinal hernia that was reportedly found when he was in the emergency room and mention to the family today by nursing staff.  The patient is too confused and sleepy to provide any meaningful history and the family says they had no idea that hernia was present.  On exam, he has a left inguinal hernia that is completely reducible and nontender.    Past Medical History:  Coronary artery disease  Congestive heart failure  Paroxysmal atrial fibrillation  Hypertension  Hyperlipidemia  History of osteomyelitis  History of skin cancer of the scalp    Past Surgical History:  Appendectomy  CABG  Skin cancer excision of scalp with skin graft    Medications:  Medications Prior to Admission   Medication Sig Dispense Refill Last Dose   • atorvastatin (LIPITOR) 20 MG tablet Take 20 mg by mouth Daily.      • Cyanocobalamin (VITAMIN B 12 PO) Take 1 tablet by mouth Every Morning.      • ferrous sulfate 325 (65 FE) MG EC tablet Take 325 mg by mouth Daily.      • metoprolol tartrate (LOPRESSOR) 100 MG tablet Take 100 mg by mouth 2 (Two) Times a Day.      • multivitamins-minerals (PRESERVISION AREDS 2) capsule capsule Take 1 capsule by mouth 2 (Two) Times a Day.      • Potassium Chloride Marisa ER (KLOR-CON M20 PO) Take 20 mEq by mouth Every Morning.      • torsemide (DEMADEX) 20 MG tablet Take 20 mg by mouth Daily.          Allergies: No known drug allergies    Social History: , non-smoker, occasional alcohol use reported in chart    Family History:  Noncontributory, unobtainable due to altered mental status    Review of Systems: Unobtainable due to altered mental status    Physical Exam:   Vitals:    02/14/23 1300   BP: 100/53   Pulse: 60   Resp: 16   Temp: 97 °F (36.1 °C)     Height: 177 cm  Weight: 64 kg  BMI: 20.37  GENERAL: Sleeping comfortably, I did not awaken him  HEENT: normocephalic, atraumatic, no scleral icterus, moist mucous membranes  NECK: Supple, there is no thyromegaly or lymphadenopathy  RESPIRATORY: clear to auscultation, no wheezes, rales or rhonchi, symmetric air entry  CARDIOVASCULAR: regular rate and rhythm    GASTROINTESTINAL: Soft, nontender, nondistended  GENITOURINARY: Reducible large left inguinal hernia that is nontender during reduction, no palpable right-sided inguinal hernia  MUSCULOSKELETAL: no cyanosis, clubbing, or edema   NEUROLOGIC: alert and oriented, normal speech, cranial nerves 2-12 grossly intact, no focal deficits   SKIN: Moist, warm, no rashes, no jaundice      Diagnostic workup:     Pertinent labs:   Results from last 7 days   Lab Units 02/13/23 0419 02/12/23 0721 02/11/23 0149 02/10/23  0814 02/09/23  0904 02/08/23  1819   WBC 10*3/mm3 8.05 7.05 10.83* 8.46 9.30 15.41*   HEMOGLOBIN g/dL 12.6* 12.6* 12.5* 13.1 11.4* 14.3   HEMATOCRIT % 40.1 39.5 38.5 40.3 37.2* 45.4   PLATELETS 10*3/mm3 187 179 193 193 170 224     Results from last 7 days   Lab Units 02/13/23 0419 02/12/23  0721 02/11/23  0149 02/09/23  0904 02/08/23  1819   SODIUM mmol/L 144 144 142   < > 139   POTASSIUM mmol/L 5.1 3.5 4.0   < > 4.4   CHLORIDE mmol/L 105 104 101   < > 100   CO2 mmol/L 32.2* 32.0* 30.0*   < > 27.0   BUN mg/dL 18 20 24*   < > 20   CREATININE mg/dL 0.82 1.06 1.37*   < > 1.34*   CALCIUM mg/dL 8.8 8.8 8.7   < > 9.2   BILIRUBIN mg/dL  --   --   --   --  1.2   ALK PHOS U/L  --   --   --   --  170*   ALT (SGPT) U/L  --   --   --   --  15   AST (SGOT) U/L  --   --   --   --  24   GLUCOSE mg/dL 109* 101* 113*   < > 142*    < > = values in  this interval not displayed.       IMAGING:  None    Assessment and plan:     The patient is a 89 y.o. male with a reducible left inguinal hernia    His left-sided inguinal hernia is completely reducible and nontender.  This will not require any surgical intervention, especially given his advanced age of 89 years.  He is not a great surgical candidate and I would not recommend anything be done surgically unless the hernia were to become incarcerated or strangulated which is not the case currently.  I will sign off but am available for any questions or concerns.  The family seemed relieved that no particular surgery would be needed.    Lorenza Berg MD  General, Robotic, and Endoscopic Surgery  [facility]    [address], [address]  [place] [address]  P: [phone]  F: [phone]

## 2023-02-14 NOTE — PLAN OF CARE
Goal Outcome Evaluation:    A&O to self and place. Cooperative and med compliant. Will continue with plan of care.

## 2023-02-14 NOTE — PROGRESS NOTES
Name: Denver Ames ADMIT: 2023   : 1925  PCP: Scottie Santiago MD    MRN: [id number] LOS: 6 days   AGE/SEX: 89 y.o. male  ROOM: [facility]     Subjective   Subjective   Had some agitation last night.  Got single dose of olanzapine with good result.    Objective   Objective   Vital Signs  Temp:  [96.6 °F (35.9 °C)-97.5 °F (36.4 °C)] 96.6 °F (35.9 °C)  Heart Rate:  [56-78] 56  Resp:  [16] 16  BP: ()/(57-72) 104/57  SpO2:  [99 %] 99 %  on  Flow (L/min):  [4] 4;   Device (Oxygen Therapy): nasal cannula  Body mass index is 20.37 kg/m².  Physical Exam  Vitals and nursing note reviewed.   Constitutional:       Appearance: He is ill-appearing (Chronically).   HENT:      Head:      Comments: Surgical wounds on scalp  Cardiovascular:      Rate and Rhythm: Normal rate and regular rhythm.   Pulmonary:      Effort: Pulmonary effort is normal.      Breath sounds: No rales.   Abdominal:      General: Bowel sounds are normal.      Palpations: Abdomen is soft.      Tenderness: There is no abdominal tenderness.   Musculoskeletal:         General: No swelling or tenderness.      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   Neurological:      General: No focal deficit present.      Mental Status: He is alert and oriented to person, place, and time.   Psychiatric:         Mood and Affect: Mood normal.         Behavior: Behavior normal.       Results Review     I reviewed the patient's new clinical results.  Results from last 7 days   Lab Units 02/13/23  0419 02/12/23  0721 02/11/23  0149 02/10/23  0814   WBC 10*3/mm3 8.05 7.05 10.83* 8.46   HEMOGLOBIN g/dL 12.6* 12.6* 12.5* 13.1   PLATELETS 10*3/mm3 187 179 193 193     Results from last 7 days   Lab Units 02/13/23  0419 02/12/23  0721 02/11/23  0149 02/10/23  0814   SODIUM mmol/L 144 144 142 146*   POTASSIUM mmol/L 5.1 3.5 4.0 3.3*   CHLORIDE mmol/L 105 104 101 101   CO2 mmol/L 32.2* 32.0* 30.0* 35.7*    BUN mg/dL 18 20 24* 19   CREATININE mg/dL 0.82 1.06 1.37* 1.09   GLUCOSE mg/dL 109* 101* 113* 79   EGFR mL/min/1.73 79.9 63.8 46.9* 61.7     Results from last 7 days   Lab Units 02/08/23  1819   ALBUMIN g/dL 4.0   BILIRUBIN mg/dL 1.2   ALK PHOS U/L 170*   AST (SGOT) U/L 24   ALT (SGPT) U/L 15     Results from last 7 days   Lab Units 02/13/23  0419 02/12/23  0721 02/11/23  0149 02/10/23  0814 02/09/23  0904 02/08/23  1819   CALCIUM mg/dL 8.8 8.8 8.7 9.2   < > 9.2   ALBUMIN g/dL  --   --   --   --   --  4.0    < > = values in this interval not displayed.     Results from last 7 days   Lab Units 02/09/23  0122 02/08/23  2139   LACTATE mmol/L 1.8 3.9*     No results found for: HGBA1C, POCGLU    CT Head Without Contrast    Result Date: 2/13/2023   No interval detrimental change is seen when compared to the prior study dated 02/10/2023. 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 On the prior exam, there was a question of subtle increased density within this collection suggesting that this may have been blood tinged on the prior examination. On the current study, this collection is isodense to CSF.  Radiation dose reduction techniques were utilized, including automated exposure control and exposure modulation based on body size.  This report was finalized on 2/13/2023 4:29 PM by Dr. Jim Coy M.D.      XR Chest 1 View    Result Date: 2/12/2023  Minimal residual right pleural effusion. Borderline heart size. Tortuous aorta.  This report was finalized on 2/12/2023 1:51 PM by Dr. Luis Fernando Sage M.D.      I have personally reviewed all medications:  Scheduled Medications  atorvastatin, 20 mg, Oral, Daily  metoprolol tartrate, 100 mg, Oral, BID  OLANZapine, 5 mg, Oral, Daily With Dinner  sodium chloride, 10 mL, Intravenous, Q12H  torsemide, 10 mg, Oral, Daily    Infusions   Diet  Diet:  "Regular/House Diet; No Straw, Feeding Assistance - Nursing, No Mixed Consistencies; Texture: Mechanical Ground (NDD 2); Fluid Consistency: Honey Thick    I have personally reviewed:  [x]  Laboratory   []  Microbiology   [x]  Radiology   []  EKG/Telemetry  []  Cardiology/Vascular   []  Pathology    []  Records       Assessment/Plan     Active Hospital Problems    Diagnosis  POA   • **Acute on chronic diastolic CHF (congestive heart failure) (HCC) [I50.33]  Yes   • Human metapneumovirus (hMPV) pneumonia [J12.3]  Yes   • Paroxysmal atrial fibrillation (HCC) [I48.0]  Yes   • Hypertension [I10]  Yes   • Hyperlipidemia [E78.5]  Yes      Resolved Hospital Problems   No resolved problems to display.       Asked yet again to make recommendation regarding left inguinal hernia.  Dr. Han has discussed with at least 1 family member prior to today.  They seem somewhat distrustful of medical opinion and will therefore ask general surgery to provide recommendation.      Human Metapneumovirus Pneumonia  - continue supportive care  - he has completed 5 days of ceftriaxone for superimposed bacterial pneumonia, will discontinue  - pulmonary toilet as able  - has \"minimal\" right pleural effusion which I suspect likely more from CHF-appears improved with diuresis; stable on repeat x-ray 2/12     Delirium  - suspect some underlying dementia though this is not formally diagnosed  - probably due to acute illness, will observe delirium precautions  - head CT scan noted with known previous SDH with possible small subacute hemorrhage-family is aware and are wanting conservative management-will avoid blood thinners  - repeat head CT 2/13 stable  - continue olanzapine as needed since this seemed to work well last evening    Acute on chronic diastolic CHF  - responded well to IV lasix then switched to torsemide the serum creatinine increased yesterday so this was held  - torsemide restarted at 10 mg daily  - creatinine has improved  - repeat " CXR improved     PAF  - rate controlled, not on AC due to falls  - continue metoprolol     Oropharyngeal Dysphagia  - he has been having some issues with 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  DNR.  Discussed with patient and family.  Anticipate discharge home with HH vs SNU facility once arrangements have been made.      Reji Dumont MD  [facility]  02/14/23  09:57 EST

## 2023-02-15 NOTE — PROGRESS NOTES
Name: eDnver Ames ADMIT: 2023   : 1925  PCP: Scottie Santiago MD    MRN: [id number] LOS: 7 days   AGE/SEX: 89 y.o. male  ROOM: [facility]     Subjective   Subjective   Patient appears generally weak, elderly, frail, & in no apparent distress.  Tolerating diet albeit decreased intake / appetite.  Staff at bedside.     Review of Systems   Constitutional: Negative for chills and fever.   Respiratory: Negative for cough and shortness of breath.    Cardiovascular: Negative for chest pain and leg swelling.   Musculoskeletal: Positive for gait problem (due to debility / activity intolerance).        Objective   Objective   Vital Signs  Temp:  [96.1 °F (35.6 °C)-97.3 °F (36.3 °C)] 97.3 °F (36.3 °C)  Heart Rate:  [] 64  Resp:  [16] 16  BP: (108-117)/(61-72) 108/68  SpO2:  [78 %-100 %] 98 %  on  Flow (L/min):  [2-15] 2;   Device (Oxygen Therapy): nasal cannula  Body mass index is 20.59 kg/m².     Physical Exam  Constitutional:       General: He is not in acute distress.     Appearance: He is not toxic-appearing.   Cardiovascular:      Rate and Rhythm: Normal rate.   Pulmonary:      Effort: Pulmonary effort is normal.      Comments: Diminished on expiration posteriorly  Abdominal:      General: Bowel sounds are normal.      Palpations: Abdomen is soft.   Musculoskeletal:      Right lower leg: No edema.      Left lower leg: No edema.   Neurological:      Mental Status: He is alert. Cooperative     *physical exam performed on 2/15/2023 as per above    Results Review     I reviewed the patient's new clinical results.  Results from last 7 days   Lab Units 02/13/23  0419 02/12/23  0721 02/11/23  0149 02/10/23  0814   WBC 10*3/mm3 8.05 7.05 10.83* 8.46   HEMOGLOBIN g/dL 12.6* 12.6* 12.5* 13.1   PLATELETS 10*3/mm3 187 179 193 193     Results from last 7 days   Lab Units 02/13/23  0419 02/12/23  0721 02/11/23  0149 02/10/23  0814   SODIUM mmol/L 144 144 142 146*   POTASSIUM mmol/L 5.1 3.5 4.0 3.3*   CHLORIDE  mmol/L 105 104 101 101   CO2 mmol/L 32.2* 32.0* 30.0* 35.7*   BUN mg/dL 18 20 24* 19   CREATININE mg/dL 0.82 1.06 1.37* 1.09   GLUCOSE mg/dL 109* 101* 113* 79   EGFR mL/min/1.73 79.9 63.8 46.9* 61.7     Results from last 7 days   Lab Units 02/08/23  1819   ALBUMIN g/dL 4.0   BILIRUBIN mg/dL 1.2   ALK PHOS U/L 170*   AST (SGOT) U/L 24   ALT (SGPT) U/L 15     Results from last 7 days   Lab Units 02/13/23  0419 02/12/23  0721 02/11/23  0149 02/10/23  0814 02/09/23  0904 02/08/23  1819   CALCIUM mg/dL 8.8 8.8 8.7 9.2   < > 9.2   ALBUMIN g/dL  --   --   --   --   --  4.0    < > = values in this interval not displayed.     Results from last 7 days   Lab Units 02/09/23  0122 02/08/23  2139   LACTATE mmol/L 1.8 3.9*     No results found for: HGBA1C, POCGLU    CT Head Without Contrast    Result Date: 2/13/2023   No interval detrimental change is seen when compared to the prior study dated 02/10/2023. 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 On the prior exam, there was a question of subtle increased density within this collection suggesting that this may have been blood tinged on the prior examination. On the current study, this collection is isodense to CSF.  Radiation dose reduction techniques were utilized, including automated exposure control and exposure modulation based on body size.  This report was finalized on 2/13/2023 4:29 PM by Dr. Jim Coy M.D.      XR Chest 1 View    Result Date: 2/15/2023  Electronically signed by Richie Orozco MD on 02-15-23 at 0542    I have personally reviewed all medications:  Scheduled Medications  atorvastatin, 20 mg, Oral, Daily  metoprolol tartrate, 100 mg, Oral, BID  OLANZapine, 5 mg, Oral, Daily With Dinner  sodium chloride, 10 mL, Intravenous, Q12H  torsemide, 10 mg, Oral, Daily    Infusions   Diet  Diet: Regular/House Diet; No Straw, Feeding  Assistance - Nursing; Texture: Pureed (NDD 1); Fluid Consistency: Honey Thick    I have personally reviewed:  [x]  Laboratory   [x]  Microbiology   [x]  Radiology   [x]  EKG/Telemetry  [x]  Cardiology/Vascular   [x]  Pathology    [x]  Records       Assessment/Plan     Active Hospital Problems    Diagnosis  POA   • **Acute on chronic diastolic CHF (congestive heart failure) ([facility]) [I50.33]  Yes   • Left inguinal hernia [K40.90]  Yes   • Human metapneumovirus (hMPV) pneumonia [J12.3]  Yes   • Paroxysmal atrial fibrillation (HCC) [I48.0]  Yes   • Hypertension [I10]  Yes   • Hyperlipidemia [E78.5]  Yes      Resolved Hospital Problems   No resolved problems to display.       89 y.o. male admitted with Acute on chronic diastolic CHF (congestive heart failure) ([facility]).    *Overnight concerns for acute respiratory failure requiring non-breather, CXR, ABG (all reviewed); however, currently tolerating 2 L via NC with oxygen saturation 95% & suspect  sleep-related hypoxia.    Human Metapneumovirus Pneumonia  - continue supportive care  - completed ceftriaxone for superimposed bacterial pneumonia  - pulmonary toilet as able  - right pleural effusion which suspect likely more from CHF & improved with diuresis given stable CXR     Delirium  - resolved on today's exam  - Suspect unmasked dementia  - probably due to acute illness  - continue delirium precautions  - repeat head CT 2/13 stable  - family aware of head CT scan with known previous SDH & possible small subacute hemorrhage & prefer conservative management; therefore, plan to avoid blood thinners     Acute on chronic diastolic CHF  - continue current oral formulation torsemide 10 mg POD daily following IV diuretic& improved renal function.  - monitor ins/outs, daily weights, and chemistries  - repeat CXR as per above     PAF  - rate controlled with metoprolol continued  - not on AC due to falls & above     Oropharyngeal Dysphagia  - intermittent issue since CABG > 10 years  "ago  - probably worsened due to above issues vs chronic progression  - Modified diet per SLP following VFSS   - family not interested in artificial feeding      Hx of SDH  - clinically he looks much better overall  - see plan above    *palliative care following & palliative medications provided on 2/15/2023     · SCDs for DVT prophylaxis.  · Comfort measures only per family   · Discussed with patient & ANGEL.  · Anticipate discharge pending facility (dispo) ? SNF vs NH as daughter states \"He's not going home\" & preferred HSB; however, [facility] states patient does not currently meet requirements for HSB & plan to follow-up in 1-2 days--CCP following      CHAR Spear  [facility]  02/15/23  0927    "

## 2023-02-15 NOTE — PROGRESS NOTES
Palliative Social Work Note    Purpose of visit: Follow up and Patient/family requested  Assessment: Patient lethargic, sleeping soundly, restless at times, PPS: 20%.   Support System: Present at bedside, Involved in care and Family  Psychosocial Needs Identified: Anxiety and Major Change/Loss/Stressor  Plan/Other Comments:   Spoke with son and dgtr at bedside per their request. Family concerned with patient's decline overnight with decreased O2 sats. They noted that patient is not eating, only bites when taking medications. They understand that patient cannot rehab. Family wants comfort measures only, continue current medications if alert enough and can swallow safely, and agreeable to comfort medications if displaying s/s of pain, shortness of breath, or anxiety/restlessness. Discussed follow up with hospice in upcoming day(s) to determine HSB vs LTC with hospice services pending patient's status and symptom management needs. They expressed understanding and agree with plan to initiate comfort care. Updated RN Cassandra Coleman, APRN, and CCP [place].       Social Work Assessment Tool (SWAT) for Palliative Care Patients and Caregivers          How well are patient and/or primary caregiver doing?    1              2          3               4                 5  Not well  Not too Neutral  Reasonably  Extremely     at all         well                      well              well                          Issue:               Patient                  Primary               Caregiver    1.End of life decisions consistent with their religous and cultural norms.   5 Extremely well   5 Extremely well   2. Patient thoughts of suicide or wanting to hasten death.  6 N/A 5 Extremely well   3. Anxiety about death    6 N/A 4 Reasonably well   4. Preferences about environment (e.g., pets, own bed etc.) 6 N/A 5 Extremely well   5. Social support    6 N/A 5 Extremely well   6. Financial resources 6 N/A 5 Extremely well   7. Safety  issues 6 N/A 5 Extremely well   8. Comfort issues 6 N/A 4 Reasonably well   9. Complicated anticipatory grief (e.g., guilt, depression, etc.) 6 N/A 4 Reasonably well   10. Awareness of prognosis 6 N/A 5 Extremely well   11. Spirituality  (e.g., higher purpose in life, sense of connection with all)  6 N/A 5 Extremely well    Total Score  52

## 2023-02-15 NOTE — NURSING NOTE
[place] called and stated she has reviewed chart and does not meet HSB eligibility at this time. They will follow-up in 1-2 days.

## 2023-02-15 NOTE — PLAN OF CARE
Problem: Adult Inpatient Plan of Care  Goal: Plan of Care Review  Outcome: Ongoing, Progressing  Flowsheets (Taken 2/15/2023 3062)  Progress: no change  Plan of Care Reviewed With:   patient   son  Outcome Evaluation: Patient was desaturating to 78%. Called on call [facility] and she ordered ABG, chest x-ray and non rebreather. Son at bedside. Kept head of bed elevated. Patient's oxygen level eventually went to within normal levels. Patient weaned off non rebreather. Patient is on 4L O2 at this time. Bed alarm on. Will continue to monitor vital signs,labs and comfort.   Goal Outcome Evaluation:  Plan of Care Reviewed With: patient, son        Progress: no change  Outcome Evaluation: Patient was desaturating to 78%. Called on call [facility] and she ordered ABG, chest x-ray and non rebreather. Son at bedside. Kept head of bed elevated. Patient's oxygen level eventually went to within normal levels. Patient weaned off non rebreather. Patient is on 4L O2 at this time. Bed alarm on. Will continue to monitor vital signs,labs and comfort.

## 2023-02-15 NOTE — PLAN OF CARE
Goal Outcome Evaluation:  Plan of Care Reviewed With: daughter, son        Progress: declining  Outcome Evaluation: Daughter and son have decided to make pt palliative. O2 2L NC. Has not eaten today. Sleeping between care. Hospice consulted, does not meet GIP criteria at this time, but they will f/u in 1-2 days. Explained to daughter we have meds to give if he has pain or anxiety/restlessness. IV placed.  Contact isolation for metapneumovirus cont.

## 2023-02-15 NOTE — CONSULTS
Visit with daughter of patient. Patient has been living with daughter for some time. She just wants him to be comfortable and well cared for. Patient is Evangelical. Daughter is going to call their  about anointing of the sick. Family is aware that chaplains are available for continued support.

## 2023-02-16 NOTE — PROGRESS NOTES
Name: Denver Ames ADMIT: 2023   : 1925  PCP: Scottie Santiago MD    MRN: [id number] LOS: 8 days   AGE/SEX: 89 y.o. male  ROOM: [facility]     Subjective   Subjective   Patient appears sedated, generally weak, elderly, frail, & in no apparent distress.  Overnight agitation requiring IV ativan.  Family at bedside.     Review of Systems   Unable to obtain due to sedation.       Objective   Objective   Vital Signs        on  Flow (L/min):  [2] 2;   Device (Oxygen Therapy): nasal cannula  Body mass index is 20.59 kg/m².     Physical Exam  Constitutional:       General: He is not in acute distress.     Appearance: He is not toxic-appearing.   Cardiovascular:      Rate and Rhythm: Normal rate.   Pulmonary:      Effort: Pulmonary effort is normal.      Comments: Diminished on expiration posteriorly  Abdominal:      General: Bowel sounds are normal.      Palpations: Abdomen is soft.   Musculoskeletal:      Right lower leg: No edema.      Left lower leg: No edema.       *physical exam performed on 2023 as per above    Results Review     I reviewed the patient's new clinical results.  Results from last 7 days   Lab Units 02/13/23  0419 02/12/23  0721 02/11/23  0149 02/10/23  0814   WBC 10*3/mm3 8.05 7.05 10.83* 8.46   HEMOGLOBIN g/dL 12.6* 12.6* 12.5* 13.1   PLATELETS 10*3/mm3 187 179 193 193     Results from last 7 days   Lab Units 02/13/23  0419 02/12/23  0721 02/11/23  0149 02/10/23  0814   SODIUM mmol/L 144 144 142 146*   POTASSIUM mmol/L 5.1 3.5 4.0 3.3*   CHLORIDE mmol/L 105 104 101 101   CO2 mmol/L 32.2* 32.0* 30.0* 35.7*   BUN mg/dL 18 20 24* 19   CREATININE mg/dL 0.82 1.06 1.37* 1.09   GLUCOSE mg/dL 109* 101* 113* 79   EGFR mL/min/1.73 79.9 63.8 46.9* 61.7         Results from last 7 days   Lab Units 02/13/23  0419 02/12/23  0721 02/11/23  0149 02/10/23  0814   CALCIUM mg/dL 8.8 8.8 8.7 9.2         No results found for: HGBA1C, POCGLU    XR Chest 1 View    Result Date:  2/15/2023  Electronically signed by Richie Orozco MD on 02-15-23 at 0542    I have personally reviewed all medications:  Scheduled Medications  OLANZapine, 5 mg, Oral, Daily With Dinner  sodium chloride, 10 mL, Intravenous, Q12H    Infusions   Diet  Diet: Regular/House Diet; No Straw, Feeding Assistance - Nursing; Texture: Pureed (NDD 1); Fluid Consistency: Honey Thick    I have personally reviewed:  [x]  Laboratory   [x]  Microbiology   [x]  Radiology   [x]  EKG/Telemetry  [x]  Cardiology/Vascular   [x]  Pathology    [x]  Records       Assessment/Plan     Active Hospital Problems    Diagnosis  POA   • **Acute on chronic diastolic CHF (congestive heart failure) ([facility]) [I50.33]  Yes   • Left inguinal hernia [K40.90]  Yes   • Human metapneumovirus (hMPV) pneumonia [J12.3]  Yes   • Paroxysmal atrial fibrillation (HCC) [I48.0]  Yes   • Hypertension [I10]  Yes   • Hyperlipidemia [E78.5]  Yes      Resolved Hospital Problems   No resolved problems to display.       89 y.o. male admitted with Acute on chronic diastolic CHF (congestive heart failure) ([facility]).    *Overnight agitation requiring IV ativan.  [facility] to re-evaluate & follow-up this afternoon decision for HSB qualifications approved with plans for HSB on 2/17/2023.  Plan to discontinue maintenance medications for comfort measures only.    Human Metapneumovirus Pneumonia  - continue supportive care  - completed ceftriaxone for superimposed bacterial pneumonia  - right pleural effusion which suspect likely more from CHF & improved with diuresis given most recent stable CXR 2/15/2023     Delirium  - continue delirium precautions  - repeat head CT 2/13 stable  - family aware of head CT scan with known previous SDH & possible small subacute hemorrhage & prefer conservative management; therefore, plan to avoid blood thinners     Acute on chronic diastolic CHF  - discontinue current oral formulation torsemide 10 mg POD daily (see above)     PAF  - rate controlled with  "metoprolol discontinued (see above)  - not on AC due to falls & above     Oropharyngeal Dysphagia  - intermittent issue since CABG > 10 years ago  - probably worsened due to above issues vs chronic progression  - Modified diet per SLP following VFSS   - family not interested in artificial feeding      Hx of SDH  - see plan above       · SCDs for DVT prophylaxis.  · Comfort measures only per family   · Discussed with patient & ANGEL.  · Anticipate discharge pending facility (dispo) ? SNF vs NH as daughter states \"He's not going home\" & preferred HSB; however, [facility] states patient does not currently meet requirements for HSB & plan to follow-up in 1-2 days--CCP following      CHAR Spear  [facility]  02/16/23  2928  "

## 2023-02-16 NOTE — PLAN OF CARE
Problem: Adult Inpatient Plan of Care  Goal: Plan of Care Review  Outcome: Ongoing, Progressing  Flowsheets (Taken 2/16/2023 0420)  Progress: declining  Plan of Care Reviewed With:   patient   daughter  Outcome Evaluation: Maintained comfortmeasures per palliative care protocol. Patient was medicated with Ativan 0.5mg and Morphine 2mg IV x2 for agitation and comfort. Daughter at beside. Bed alarm on. Will continue to monitor vital signs and comfort.   Goal Outcome Evaluation:  Plan of Care Reviewed With: patient, daughter        Progress: declining  Outcome Evaluation: Maintained comfortmeasures per palliative care protocol. Patient was medicated with Ativan 0.5mg and Morphine 2mg IV x2 for agitation and comfort. Daughter at beside. Bed alarm on. Will continue to monitor vital signs and comfort.

## 2023-02-16 NOTE — PLAN OF CARE
Goal Outcome Evaluation:  Plan of Care Reviewed With: patient, family        Progress: declining  Outcome Evaluation: PPS 10%. Pt has been resting comfortably and has not needed any pain medication. Family at bedside. Pt has no ate today. Pt to be HSB tomorrow. No needs at this time.

## 2023-02-16 NOTE — CONSULTS
Met with patient son and dgtr and provided explanation of services. Consents signed and dated for tomorrow. Plan is HSB tomorrow. [facility] to follow up then.     Thank you for the referral.    Reena Crook RN  [facility]  [phone]

## 2023-02-17 PROBLEM — I50.9 CHF (CONGESTIVE HEART FAILURE): Status: ACTIVE | Noted: 2023-01-01

## 2023-02-17 NOTE — DISCHARGE SUMMARY
Mr. Ames is a 89 y.o. non-smoker with a history of HTN, HLD, CAD s/p CABG, chronic diastolic CHF, PAF (not on AC due to recurrent falls), SDH from fall, skin cancer on scalp s/p resection and flap, and osteomyelitis of the right foot s/p toe amputation a couple weeks ago,that presents to [facility] with cough and shortness of breath for the past several days.    He presented with human metapneumovirus pneumonia and likely superimposed bacterial pneumonia as well as Acute on chronic chf. SLP assessed and is on modified diet. He had been having some delirium leading up to admission and early in the admission.  Family has said they do not want escalation of care including invasive procedures. He continued to decline and is clearly at the end of his life and will be admitted to hospice for comfort measures.     Electronically signed by Segun Dumont MD, 02/17/23, 1:26 PM EST.

## 2023-02-17 NOTE — PROGRESS NOTES
Case Management Discharge Note      Final Note: Admitted to a [facility] scattered bed on 2/17/23. VERNON Vee RN CCP         Selected Continued Care - Discharged on 2/17/2023 Admission date: 2/8/2023 - Discharge disposition: Swing Bed w/Planned Readmission    Destination Coordination complete.    Service Provider Selected Services Address Phone Fax Patient Preferred    [facility] [address], [facility] [address] [phone] --          Durable Medical Equipment    No services have been selected for the patient.              Dialysis/Infusion    No services have been selected for the patient.              Home Medical Care    No services have been selected for the patient.              Therapy    No services have been selected for the patient.              Community Resources    No services have been selected for the patient.              Community & DME    No services have been selected for the patient.                       Final Discharge Disposition Code: 51 - hospice medical facility

## (undated) DEVICE — STAPLER, SKIN, 35W, A: Brand: MEDLINE INDUSTRIES, INC.

## (undated) DEVICE — OPTIFOAM GENTLE SA, POSTOP, 4X12: Brand: MEDLINE

## (undated) DEVICE — GLV SURG BIOGEL SENSR LTX PF SZ7.5

## (undated) DEVICE — INTENDED FOR TISSUE SEPARATION, AND OTHER PROCEDURES THAT REQUIRE A SHARP SURGICAL BLADE TO PUNCTURE OR CUT.: Brand: BARD-PARKER ® CARBON RIB-BACK BLADES

## (undated) DEVICE — SOL NACL 0.9PCT 1000ML

## (undated) DEVICE — SUT ETHLN 4/0 PS2 PLSTC 1667G

## (undated) DEVICE — 4.0MM EGG BUR

## (undated) DEVICE — GAUZE,SPONGE,FLUFF,6"X6.75",STRL,10/TRAY: Brand: MEDLINE

## (undated) DEVICE — PURE ULTRA WHITE PETROLEUM JELLY,NET WT. 5 G: Brand: VASELINE

## (undated) DEVICE — SUT MNCRYL 3/0 PS2 18IN MCP497G

## (undated) DEVICE — SPK PIN NSR FLUID NONVNT 2WY MINI LL LF

## (undated) DEVICE — ELECTRD NDL EZ CLN MOD 2.75IN

## (undated) DEVICE — APPL CHLORAPREP HI/LITE 26ML ORNG

## (undated) DEVICE — PAD,ABDOMINAL,8"X10",ST,LF: Brand: MEDLINE

## (undated) DEVICE — DRSNG WND GZ CURAD OIL EMULSION 3X8IN LF STRL 1PK

## (undated) DEVICE — PK UNIV COMPL 40

## (undated) DEVICE — PETROLM JELLY CURAD TB1OZ

## (undated) DEVICE — NDL HYPO PRECISIONGLIDE/REG 27G 1/2 GRY

## (undated) DEVICE — PROXIMATE RH ROTATING HEAD SKIN STAPLERS (35 WIDE) CONTAINS 35 STAINLESS STEEL STAPLES: Brand: PROXIMATE